# Patient Record
Sex: FEMALE | Race: WHITE | NOT HISPANIC OR LATINO | Employment: OTHER | URBAN - METROPOLITAN AREA
[De-identification: names, ages, dates, MRNs, and addresses within clinical notes are randomized per-mention and may not be internally consistent; named-entity substitution may affect disease eponyms.]

---

## 2018-08-28 ENCOUNTER — HOSPITAL ENCOUNTER (OUTPATIENT)
Dept: RADIOLOGY | Facility: MEDICAL CENTER | Age: 73
End: 2018-08-28

## 2018-08-28 ENCOUNTER — HOSPITAL ENCOUNTER (INPATIENT)
Facility: MEDICAL CENTER | Age: 73
LOS: 4 days | DRG: 694 | End: 2018-09-01
Attending: EMERGENCY MEDICINE | Admitting: HOSPITALIST
Payer: MEDICARE

## 2018-08-28 ENCOUNTER — HOSPITAL ENCOUNTER (OUTPATIENT)
Facility: MEDICAL CENTER | Age: 73
End: 2018-08-28

## 2018-08-28 DIAGNOSIS — N13.2 HYDRONEPHROSIS WITH URINARY OBSTRUCTION DUE TO RENAL CALCULUS: ICD-10-CM

## 2018-08-28 DIAGNOSIS — N20.1 URETERAL STONE: ICD-10-CM

## 2018-08-28 PROBLEM — R82.90 ABNORMAL URINALYSIS: Status: ACTIVE | Noted: 2018-08-28

## 2018-08-28 PROBLEM — E03.9 ACQUIRED HYPOTHYROIDISM: Status: ACTIVE | Noted: 2018-08-28

## 2018-08-28 PROBLEM — E78.5 DYSLIPIDEMIA: Status: ACTIVE | Noted: 2018-08-28

## 2018-08-28 PROBLEM — Z72.0 TOBACCO USE: Status: ACTIVE | Noted: 2018-08-28

## 2018-08-28 LAB
ALBUMIN SERPL BCP-MCNC: 3.8 G/DL (ref 3.2–4.9)
ALBUMIN/GLOB SERPL: 1.3 G/DL
ALP SERPL-CCNC: 88 U/L (ref 30–99)
ALT SERPL-CCNC: 13 U/L (ref 2–50)
ANION GAP SERPL CALC-SCNC: 9 MMOL/L (ref 0–11.9)
APPEARANCE UR: ABNORMAL
AST SERPL-CCNC: 18 U/L (ref 12–45)
BACTERIA #/AREA URNS HPF: ABNORMAL /HPF
BASOPHILS # BLD AUTO: 0.4 % (ref 0–1.8)
BASOPHILS # BLD: 0.11 K/UL (ref 0–0.12)
BILIRUB SERPL-MCNC: 0.6 MG/DL (ref 0.1–1.5)
BILIRUB UR QL STRIP.AUTO: NEGATIVE
BUN SERPL-MCNC: 23 MG/DL (ref 8–22)
CALCIUM SERPL-MCNC: 9.1 MG/DL (ref 8.5–10.5)
CHLORIDE SERPL-SCNC: 108 MMOL/L (ref 96–112)
CO2 SERPL-SCNC: 24 MMOL/L (ref 20–33)
COLOR UR: ABNORMAL
CREAT SERPL-MCNC: 1.03 MG/DL (ref 0.5–1.4)
EKG IMPRESSION: NORMAL
EOSINOPHIL # BLD AUTO: 0 K/UL (ref 0–0.51)
EOSINOPHIL NFR BLD: 0 % (ref 0–6.9)
EPI CELLS #/AREA URNS HPF: NEGATIVE /HPF
ERYTHROCYTE [DISTWIDTH] IN BLOOD BY AUTOMATED COUNT: 50.6 FL (ref 35.9–50)
GLOBULIN SER CALC-MCNC: 2.9 G/DL (ref 1.9–3.5)
GLUCOSE SERPL-MCNC: 142 MG/DL (ref 65–99)
GLUCOSE UR STRIP.AUTO-MCNC: NEGATIVE MG/DL
HCT VFR BLD AUTO: 41.8 % (ref 37–47)
HGB BLD-MCNC: 13.2 G/DL (ref 12–16)
HYALINE CASTS #/AREA URNS LPF: ABNORMAL /LPF
IMM GRANULOCYTES # BLD AUTO: 0.22 K/UL (ref 0–0.11)
IMM GRANULOCYTES NFR BLD AUTO: 0.8 % (ref 0–0.9)
KETONES UR STRIP.AUTO-MCNC: NEGATIVE MG/DL
LEUKOCYTE ESTERASE UR QL STRIP.AUTO: ABNORMAL
LIPASE SERPL-CCNC: 11 U/L (ref 11–82)
LYMPHOCYTES # BLD AUTO: 0.41 K/UL (ref 1–4.8)
LYMPHOCYTES NFR BLD: 1.5 % (ref 22–41)
MCH RBC QN AUTO: 29.2 PG (ref 27–33)
MCHC RBC AUTO-ENTMCNC: 31.6 G/DL (ref 33.6–35)
MCV RBC AUTO: 92.5 FL (ref 81.4–97.8)
MICRO URNS: ABNORMAL
MONOCYTES # BLD AUTO: 1.16 K/UL (ref 0–0.85)
MONOCYTES NFR BLD AUTO: 4.1 % (ref 0–13.4)
NEUTROPHILS # BLD AUTO: 26.3 K/UL (ref 2–7.15)
NEUTROPHILS NFR BLD: 93.2 % (ref 44–72)
NITRITE UR QL STRIP.AUTO: POSITIVE
NRBC # BLD AUTO: 0 K/UL
NRBC BLD-RTO: 0 /100 WBC
PH UR STRIP.AUTO: 5.5 [PH]
PLATELET # BLD AUTO: 173 K/UL (ref 164–446)
PMV BLD AUTO: 11.6 FL (ref 9–12.9)
POTASSIUM SERPL-SCNC: 3.8 MMOL/L (ref 3.6–5.5)
PROT SERPL-MCNC: 6.7 G/DL (ref 6–8.2)
PROT UR QL STRIP: 100 MG/DL
RBC # BLD AUTO: 4.52 M/UL (ref 4.2–5.4)
RBC # URNS HPF: ABNORMAL /HPF
RBC UR QL AUTO: ABNORMAL
SODIUM SERPL-SCNC: 141 MMOL/L (ref 135–145)
SP GR UR STRIP.AUTO: 1.01
UROBILINOGEN UR STRIP.AUTO-MCNC: 1 MG/DL
WBC # BLD AUTO: 28.2 K/UL (ref 4.8–10.8)
WBC #/AREA URNS HPF: ABNORMAL /HPF

## 2018-08-28 PROCEDURE — 87186 SC STD MICRODIL/AGAR DIL: CPT

## 2018-08-28 PROCEDURE — 99223 1ST HOSP IP/OBS HIGH 75: CPT | Performed by: HOSPITALIST

## 2018-08-28 PROCEDURE — 700111 HCHG RX REV CODE 636 W/ 250 OVERRIDE (IP): Performed by: HOSPITALIST

## 2018-08-28 PROCEDURE — 81001 URINALYSIS AUTO W/SCOPE: CPT

## 2018-08-28 PROCEDURE — 83690 ASSAY OF LIPASE: CPT

## 2018-08-28 PROCEDURE — 87086 URINE CULTURE/COLONY COUNT: CPT

## 2018-08-28 PROCEDURE — 700105 HCHG RX REV CODE 258: Performed by: EMERGENCY MEDICINE

## 2018-08-28 PROCEDURE — 80053 COMPREHEN METABOLIC PANEL: CPT

## 2018-08-28 PROCEDURE — 87077 CULTURE AEROBIC IDENTIFY: CPT

## 2018-08-28 PROCEDURE — 700102 HCHG RX REV CODE 250 W/ 637 OVERRIDE(OP): Performed by: HOSPITALIST

## 2018-08-28 PROCEDURE — 700105 HCHG RX REV CODE 258: Performed by: HOSPITALIST

## 2018-08-28 PROCEDURE — 85025 COMPLETE CBC W/AUTO DIFF WBC: CPT

## 2018-08-28 PROCEDURE — 93005 ELECTROCARDIOGRAM TRACING: CPT | Performed by: EMERGENCY MEDICINE

## 2018-08-28 PROCEDURE — 770006 HCHG ROOM/CARE - MED/SURG/GYN SEMI*

## 2018-08-28 PROCEDURE — A9270 NON-COVERED ITEM OR SERVICE: HCPCS | Performed by: HOSPITALIST

## 2018-08-28 PROCEDURE — 96365 THER/PROPH/DIAG IV INF INIT: CPT

## 2018-08-28 PROCEDURE — 99285 EMERGENCY DEPT VISIT HI MDM: CPT

## 2018-08-28 PROCEDURE — 700111 HCHG RX REV CODE 636 W/ 250 OVERRIDE (IP): Performed by: EMERGENCY MEDICINE

## 2018-08-28 PROCEDURE — 96375 TX/PRO/DX INJ NEW DRUG ADDON: CPT

## 2018-08-28 RX ORDER — SODIUM CHLORIDE 9 MG/ML
INJECTION, SOLUTION INTRAVENOUS CONTINUOUS
Status: DISCONTINUED | OUTPATIENT
Start: 2018-08-28 | End: 2018-08-28

## 2018-08-28 RX ORDER — LEVOTHYROXINE SODIUM 0.07 MG/1
75 TABLET ORAL
Status: DISCONTINUED | OUTPATIENT
Start: 2018-08-28 | End: 2018-09-01 | Stop reason: HOSPADM

## 2018-08-28 RX ORDER — KETOROLAC TROMETHAMINE 30 MG/ML
15 INJECTION, SOLUTION INTRAMUSCULAR; INTRAVENOUS EVERY 6 HOURS PRN
Status: DISCONTINUED | OUTPATIENT
Start: 2018-08-28 | End: 2018-09-01 | Stop reason: HOSPADM

## 2018-08-28 RX ORDER — ONDANSETRON 2 MG/ML
4 INJECTION INTRAMUSCULAR; INTRAVENOUS ONCE
Status: COMPLETED | OUTPATIENT
Start: 2018-08-28 | End: 2018-08-28

## 2018-08-28 RX ORDER — PRAVASTATIN SODIUM 20 MG
80 TABLET ORAL NIGHTLY
Status: DISCONTINUED | OUTPATIENT
Start: 2018-08-28 | End: 2018-09-01 | Stop reason: HOSPADM

## 2018-08-28 RX ORDER — SODIUM CHLORIDE 9 MG/ML
INJECTION, SOLUTION INTRAVENOUS CONTINUOUS
Status: DISCONTINUED | OUTPATIENT
Start: 2018-08-28 | End: 2018-08-31

## 2018-08-28 RX ORDER — BACLOFEN 10 MG/1
10-20 TABLET ORAL 3 TIMES DAILY PRN
Status: DISCONTINUED | OUTPATIENT
Start: 2018-08-28 | End: 2018-09-01 | Stop reason: HOSPADM

## 2018-08-28 RX ORDER — IBUPROFEN 200 MG
200 TABLET ORAL EVERY 6 HOURS PRN
COMMUNITY
End: 2018-09-21

## 2018-08-28 RX ORDER — PRAVASTATIN SODIUM 80 MG/1
80 TABLET ORAL NIGHTLY
COMMUNITY

## 2018-08-28 RX ORDER — GABAPENTIN 400 MG/1
800 CAPSULE ORAL 3 TIMES DAILY
Status: DISCONTINUED | OUTPATIENT
Start: 2018-08-28 | End: 2018-09-01 | Stop reason: HOSPADM

## 2018-08-28 RX ORDER — AMOXICILLIN 250 MG
2 CAPSULE ORAL 2 TIMES DAILY
Status: DISCONTINUED | OUTPATIENT
Start: 2018-08-28 | End: 2018-09-01 | Stop reason: HOSPADM

## 2018-08-28 RX ORDER — BACLOFEN 10 MG/1
10-20 TABLET ORAL 2 TIMES DAILY
COMMUNITY

## 2018-08-28 RX ORDER — ONDANSETRON 2 MG/ML
4 INJECTION INTRAMUSCULAR; INTRAVENOUS EVERY 4 HOURS PRN
Status: DISCONTINUED | OUTPATIENT
Start: 2018-08-28 | End: 2018-09-01 | Stop reason: HOSPADM

## 2018-08-28 RX ORDER — GABAPENTIN 800 MG/1
800 TABLET ORAL 3 TIMES DAILY
COMMUNITY

## 2018-08-28 RX ORDER — LABETALOL HYDROCHLORIDE 5 MG/ML
10 INJECTION, SOLUTION INTRAVENOUS EVERY 4 HOURS PRN
Status: DISCONTINUED | OUTPATIENT
Start: 2018-08-28 | End: 2018-09-01 | Stop reason: HOSPADM

## 2018-08-28 RX ORDER — POLYETHYLENE GLYCOL 3350 17 G/17G
1 POWDER, FOR SOLUTION ORAL
Status: DISCONTINUED | OUTPATIENT
Start: 2018-08-28 | End: 2018-09-01 | Stop reason: HOSPADM

## 2018-08-28 RX ORDER — ONDANSETRON 4 MG/1
4 TABLET, ORALLY DISINTEGRATING ORAL EVERY 4 HOURS PRN
Status: DISCONTINUED | OUTPATIENT
Start: 2018-08-28 | End: 2018-09-01 | Stop reason: HOSPADM

## 2018-08-28 RX ORDER — MORPHINE SULFATE 4 MG/ML
4 INJECTION, SOLUTION INTRAMUSCULAR; INTRAVENOUS
Status: DISCONTINUED | OUTPATIENT
Start: 2018-08-28 | End: 2018-08-28

## 2018-08-28 RX ORDER — BISACODYL 10 MG
10 SUPPOSITORY, RECTAL RECTAL
Status: DISCONTINUED | OUTPATIENT
Start: 2018-08-28 | End: 2018-09-01 | Stop reason: HOSPADM

## 2018-08-28 RX ORDER — ACETAMINOPHEN 325 MG/1
650 TABLET ORAL EVERY 6 HOURS PRN
Status: DISCONTINUED | OUTPATIENT
Start: 2018-08-28 | End: 2018-09-01 | Stop reason: HOSPADM

## 2018-08-28 RX ADMIN — KETOROLAC TROMETHAMINE 15 MG: 30 INJECTION, SOLUTION INTRAMUSCULAR at 20:23

## 2018-08-28 RX ADMIN — CEFTRIAXONE SODIUM 1 G: 1 INJECTION, POWDER, FOR SOLUTION INTRAMUSCULAR; INTRAVENOUS at 11:41

## 2018-08-28 RX ADMIN — SODIUM CHLORIDE: 9 INJECTION, SOLUTION INTRAVENOUS at 11:41

## 2018-08-28 RX ADMIN — ONDANSETRON HYDROCHLORIDE 4 MG: 2 INJECTION, SOLUTION INTRAMUSCULAR; INTRAVENOUS at 11:40

## 2018-08-28 RX ADMIN — GABAPENTIN 800 MG: 400 CAPSULE ORAL at 17:42

## 2018-08-28 RX ADMIN — SODIUM CHLORIDE: 9 INJECTION, SOLUTION INTRAVENOUS at 15:18

## 2018-08-28 RX ADMIN — BACLOFEN 10 MG: 10 TABLET ORAL at 20:26

## 2018-08-28 RX ADMIN — Medication 2 TABLET: at 17:42

## 2018-08-28 RX ADMIN — LEVOTHYROXINE SODIUM 75 MCG: 75 TABLET ORAL at 15:17

## 2018-08-28 RX ADMIN — PRAVASTATIN SODIUM 80 MG: 20 TABLET ORAL at 20:23

## 2018-08-28 ASSESSMENT — COGNITIVE AND FUNCTIONAL STATUS - GENERAL
MOBILITY SCORE: 24
SUGGESTED CMS G CODE MODIFIER MOBILITY: CH
DAILY ACTIVITIY SCORE: 24
SUGGESTED CMS G CODE MODIFIER DAILY ACTIVITY: CH

## 2018-08-28 ASSESSMENT — ENCOUNTER SYMPTOMS
NERVOUS/ANXIOUS: 0
NAUSEA: 1
STRIDOR: 0
HEADACHES: 0
COUGH: 0
VOMITING: 1
BACK PAIN: 1
ABDOMINAL PAIN: 1
FEVER: 0
CHILLS: 0
NECK PAIN: 0
PALPITATIONS: 0
SHORTNESS OF BREATH: 0

## 2018-08-28 ASSESSMENT — PATIENT HEALTH QUESTIONNAIRE - PHQ9
SUM OF ALL RESPONSES TO PHQ9 QUESTIONS 1 AND 2: 0
2. FEELING DOWN, DEPRESSED, IRRITABLE, OR HOPELESS: NOT AT ALL
1. LITTLE INTEREST OR PLEASURE IN DOING THINGS: NOT AT ALL

## 2018-08-28 ASSESSMENT — COPD QUESTIONNAIRES
DO YOU EVER COUGH UP ANY MUCUS OR PHLEGM?: NO/ONLY WITH OCCASIONAL COLDS OR INFECTIONS
COPD SCREENING SCORE: 5
HAVE YOU SMOKED AT LEAST 100 CIGARETTES IN YOUR ENTIRE LIFE: YES
DURING THE PAST 4 WEEKS HOW MUCH DID YOU FEEL SHORT OF BREATH: SOME OF THE TIME
IN THE PAST 12 MONTHS DO YOU DO LESS THAN YOU USED TO BECAUSE OF YOUR BREATHING PROBLEMS: DISAGREE/UNSURE

## 2018-08-28 ASSESSMENT — PAIN SCALES - GENERAL
PAINLEVEL_OUTOF10: 0
PAINLEVEL_OUTOF10: 5

## 2018-08-28 ASSESSMENT — LIFESTYLE VARIABLES: ALCOHOL_USE: NO

## 2018-08-28 NOTE — ED NOTES
Med rec updated and complete  Allergies reviewed  Pt was not sure the strengths of all her medications.  Called [facility] @ [phone] to verify all prescription medications.  Pt reports no antibiotics in the last 30 days.

## 2018-08-28 NOTE — ASSESSMENT & PLAN NOTE
Patient states some burning with urination   UA showed blood, leukocyte Estrace positive, positive bacteria but no significant leukocytosis  IV Rocephin initiated   Nephrostomy tube in place

## 2018-08-28 NOTE — ASSESSMENT & PLAN NOTE
Dr. Antonio Pillai, urologist, consulted by emergency room [facility]  -IV fluids normal saline 125 ML's per hour  -Pain control with Toradol when necessary and morphine as needed  -Baclofen every 8 hours as needed  PRN antiemetics   Nephrostomy tube placed

## 2018-08-28 NOTE — H&P
Hospital Medicine History & Physical Note    Date of Service  2018    Primary Care Physician  Madhav Rasmussen M.D.    Consultants  Urology    Code Status  DNR/DNI    Chief Complaint  Left flank and abdominal pain.    History of Presenting Illness  73 y.o. female who presented 2018 with acute onset of left flank left lower quadrant abdominal severe pain at 2 AM that woke her from sleep. Patient went straight to the hospital up in [facility] upon she is found to have an obstructing 2.2 x 1.6 cm obstructing stone in the left renal pelvis causing moderate left hydronephrosis. Patient states some dark urine no noted gross hematuria. She denied any fevers or chills. She had a prior kidney stone back in the .    Review of Systems  Review of Systems   Constitutional: Negative for chills and fever.   Respiratory: Negative for cough, shortness of breath and stridor.    Cardiovascular: Negative for chest pain and palpitations.   Gastrointestinal: Positive for abdominal pain, nausea and vomiting.   Genitourinary: Negative for dysuria and hematuria.   Musculoskeletal: Positive for back pain (left flank). Negative for neck pain.   Neurological: Negative for headaches.   Psychiatric/Behavioral: The patient is not nervous/anxious.        Past Medical History  Hypothyroid  Hyperlipidemia  Chronic back pain  Tobacco use    Surgical History   has a past surgical history that includes other (childhood); other abdominal surgery (?); gyn surgery (?); other orthopedic surgery; other orthopedic surgery; other neurological surg (); other neurological surg (); other neurological surg (); and lumbar laminectomy diskectomy (10/16/2010).     Family History  Mother  in her 30s secondary to motor vehicle accident  Father  in his 60s secondary to brain aneurysm that occurred as a complication from prostate cancer surgery    Social History  She has lived her life this being single  She is retired  from administration  She has no children  She has not used any illicit drugs  To smoke 6 cigarettes a day and has no plan to stop despite our discussion on cessation and the harms of smoking  She is not having any significant alcohol history    Allergies  Allergies   Allergen Reactions   • E-Mycin [Erythromycin] Anaphylaxis       Medications  Prior to Admission Medications   Prescriptions Last Dose Informant Patient Reported? Taking?   B Complex Vitamins (B COMPLEX 1 PO) 8/27/2018 at 0600 Patient Yes No   Sig: Take 1 Tab by mouth every day.   Calcium Carbonate (CALCIUM 600) 600 MG TABS 8/27/2018 at 0600 Patient Yes No   Sig: Take 1 Tab by mouth 2 Times a Day.   baclofen (LIORESAL) 10 MG Tab 8/27/2018 at 1900 Patient's Home Pharmacy Yes Yes   Sig: Take 10-20 mg by mouth 2 Times a Day. Pt takes:  10MG in AM  20MG HS   gabapentin (NEURONTIN) 800 MG tablet 8/27/2018 at 1900 Patient's Home Pharmacy Yes Yes   Sig: Take 800 mg by mouth 3 times a day.   ibuprofen (MOTRIN) 200 MG Tab > 10 days at Unknown Patient Yes Yes   Sig: Take 200 mg by mouth every 6 hours as needed for Mild Pain.   levothyroxine (SYNTHROID) 75 MCG TABS 8/27/2018 at 0600 Patient's Home Pharmacy Yes No   Sig: Take 75 mcg by mouth every day.   pravastatin (PRAVACHOL) 80 MG tablet 8/27/2018 at 1900 Patient's Home Pharmacy Yes Yes   Sig: Take 80 mg by mouth every evening.   senna-docusate (JESS-COLACE) 8.6-50 MG TABS > 1 week at Unknown Patient Yes No   Sig: Take 2 Tabs by mouth as needed for Constipation.      Facility-Administered Medications: None       Physical Exam  Blood Pressure : 103/56   Temperature: 36.9 °C (98.4 °F)   Pulse: 69   Respiration: 18   Pulse Oximetry: 100 %     Physical Exam   Constitutional: She is oriented to person, place, and time. She appears well-developed and well-nourished. No distress.   HENT:   Head: Normocephalic and atraumatic.   Nose: Nose normal.   Mouth/Throat: Oropharynx is clear and moist.   Eyes: Conjunctivae and EOM  are normal. Right eye exhibits no discharge. Left eye exhibits no discharge. No scleral icterus.   Neck: No tracheal deviation present.   Posterior cervical area with well-healed cervical surgical scar   Cardiovascular: Normal rate, regular rhythm, normal heart sounds and intact distal pulses.    No murmur heard.  Pulmonary/Chest: Effort normal and breath sounds normal. No respiratory distress. She has no wheezes. She has no rales.   Slightly diminished breath sounds throughout   Abdominal: Soft. Bowel sounds are normal. She exhibits no distension. There is tenderness (mild tenderness left lower abdomen and left flank).   Musculoskeletal: She exhibits no edema.   Lymphadenopathy:     She has no cervical adenopathy.   Neurological: She is alert and oriented to person, place, and time. No cranial nerve deficit.   Skin: Skin is warm. She is not diaphoretic.   Psychiatric: She has a normal mood and affect. Her behavior is normal. Thought content normal.   Vitals reviewed.      Laboratory:  [facility] labs from 8/28/2018:  Urinalysis shows 2+ blood and positive nitrites 3+ leukocyte Estrace 0-3 WBCs 1+ bacteria  CBC shows WBC 12.8, hemoglobin 13.4, hematocrit 39.3, platelet 155  Glucose 1:15  BUN 21  Creatinine 0.9  Sodium 142  Potassium 3.8  Chloride 107  CO2 24  Calcium 9.5  Albumin 3.7   liver enzymes unremarkable      Imaging:  As above in history of present illness      Assessment/Plan:  I anticipate this patient will require at least two midnights for appropriate medical management, necessitating inpatient admission.    Hydronephrosis with urinary obstruction due to renal calculus- (present on admission)   Assessment & Plan    Dr. Antonio Pillai, urologist, consulted by emergency room M.D.  Patient will be on IV fluids normal saline 125 ML's per hour  Pain control with Toradol when necessary and morphine as needed  Will continue with her baclofen every 8 hours as needed  Antinausea medications as  needed  Possible nephrostomy tube versus surgery        Abnormal urinalysis   Assessment & Plan    Patient denies any dysuria or recent frequency  Outside UA showed blood, leukocyte Estrace positive, positive bacteria but no significant leukocytosis  Await repeat urinalysis here at [facility]        Acquired hypothyroidism   Assessment & Plan    Continue with active treatment with levothyroxine        Dyslipidemia   Assessment & Plan    Continue with pravastatin for ongoing active treatment        Tobacco use   Assessment & Plan    Smoking cessation discussed the patient has no interest in quitting. She did not wish to continue any further discussion on the matter.  States she smokes 6 cigarettes a day.  Nicotine patch if so required            VTE prophylaxis: Lovenox

## 2018-08-28 NOTE — ASSESSMENT & PLAN NOTE
Smoking cessation discussed the patient has no interest in quitting. She did not wish to continue any further discussion on the matter.  States she smokes 6 cigarettes a day.  Nicotine patch if so required

## 2018-08-28 NOTE — ED PROVIDER NOTES
ED Provider Note    CHIEF COMPLAINT  No chief complaint on file.      HPI  Shanelle Unger is a 73 y.o. female with a history of hypercholesterolemia, borderline hypertension, kidney stone ×1 who presents by EMS on transfer from [facility] with sudden onset of left lower quadrant and left flank pain beginning at about 2 AM this morning.  The patient says she was awakened from sleep with severe pain to her left lower abdomen.  She went to the outside hospital where a CT scan showed a 2.2 x 1.6 cm stone in the left renal pelvis with moderate left hydronephrosis.  The patient received Toradol, Dilaudid, Pyridium, prior to arrival.  At this time she complains of some mild pain, says the pain is much better.  She says she is very thirsty.  She did have nausea and vomiting after the onset of her pain.  She has had a previous total abdominal hysterectomy and appendectomy in the past.    REVIEW OF SYSTEMS  See HPI for further details. All other systems are negative.     PAST MEDICAL HISTORY  Past Medical History:   Diagnosis Date   • Anesthesia     post-op nausea slow to wake up   • Unspecified disorder of thyroid        FAMILY HISTORY  No family history on file.    SOCIAL HISTORY  Social History     Social History   • Marital status: Single     Spouse name: N/A   • Number of children: N/A   • Years of education: N/A     Social History Main Topics   • Smoking status: Current Every Day Smoker   • Smokeless tobacco: Not on file      Comment: 1/2ppd   50 years   • Alcohol use Yes      Comment: rare   • Drug use: No   • Sexual activity: Not on file     Other Topics Concern   • Not on file     Social History Narrative   • No narrative on file       SURGICAL HISTORY  Past Surgical History:   Procedure Laterality Date   • LUMBAR LAMINECTOMY DISKECTOMY  10/16/2010    Performed by AUNDREA FERRER at SURGERY [facility]   • OTHER NEUROLOGICAL SURG  2007    cervical   • OTHER NEUROLOGICAL SURG  1998    cervical    •  OTHER NEUROLOGICAL SURG  1972    C Spine   • GYN SURGERY  1983?    hysterectomy   • OTHER  childhood    tonsillectomy   • OTHER ABDOMINAL SURGERY  1962?    appendectomy   • OTHER ORTHOPEDIC SURGERY      RT Knee Scope   • OTHER ORTHOPEDIC SURGERY      RT Thumb tendon release       CURRENT MEDICATIONS  Home Medications     Reviewed by Chen Kaba (Pharmacy Tech) on 08/28/18 at 1114  Med List Status: Complete   Medication Last Dose Status   B Complex Vitamins (B COMPLEX 1 PO) 8/27/2018 Active   baclofen (LIORESAL) 10 MG Tab 8/27/2018 Active   Calcium Carbonate (CALCIUM 600) 600 MG TABS 8/27/2018 Active   gabapentin (NEURONTIN) 800 MG tablet 8/27/2018 Active   ibuprofen (MOTRIN) 200 MG Tab > 10 days Active   levothyroxine (SYNTHROID) 75 MCG TABS 8/27/2018 Active   pravastatin (PRAVACHOL) 80 MG tablet 8/27/2018 Active   senna-docusate (JESS-COLACE) 8.6-50 MG TABS > 1 week Active                ALLERGIES  Allergies   Allergen Reactions   • E-Mycin [Erythromycin] Anaphylaxis       PHYSICAL EXAM  VITAL SIGNS: /56   Pulse 69   Temp 36.9 °C (98.4 °F)   Resp 18   Wt 54.4 kg (120 lb)   SpO2 100%   BMI 21.95 kg/m²   Constitutional: Awake, alert, in no acute distress, Non-toxic appearance.   HENT: Atraumatic. Bilateral external ears normal, mucous membranes very dry, throat nonerythematous without exudates, nose is normal.  Eyes: PERRL, EOMI, conjunctiva moist, noninjected.  Neck: Nontender, Normal range of motion, No nuchal rigidity, No stridor.   Lymphatic: No lymphadenopathy noted.   Cardiovascular: Regular rate and rhythm, no murmurs, rubs, gallops.  Thorax & Lungs:  Good breath sounds bilaterally, no wheezes, rales, or retractions.  No chest tenderness.  Abdomen: Bowel sounds normal, Soft, nondistended, there is tenderness to the left lower quadrant, no tenderness to the upper abdomen in the right lower quadrant, no rebound, guarding, masses.  Back: No spinal tenderness, moderate left CVA  tenderness.  Extremities: Intact distal pulses, No edema, No tenderness.   Skin: Warm, Dry, No rashes.   Musculoskeletal: No joint swelling or tenderness.  Neurologic: Alert & oriented x 3, sensory and motor function normal. No focal deficits.   Psychiatric: Affect normal, Judgment normal, Mood normal.     EKG  Twelve-lead EKG shows normal sinus rhythm, rate of 68, normal intervals, normal axis, no acute ST wave elevations or ST depressions, no pathologic Q waves, no evidence of an acute injury or ischemic pattern on my reading, there is no previous EKG for comparison.        RADIOLOGY/PROCEDURES  CT scan of the abdomen/pelvis without contrast from outside hospital was read as showing a large 2.2 x 1.6 cm obstructing stone in the left renal pelvis causing moderate left hydronephrosis.    COURSE & MEDICAL DECISION MAKING  Pertinent Labs & Imaging studies reviewed. (See chart for details)  The patient presents on transfer with a large left obstructing renal stone.  Her pain is in fairly good control at this time.  She was continued on IV fluids as she is n.p.o. and has very dry mucous membranes.  Results from outside hospital were reviewed shows a white count of 12,800, BUN of 21, creatinine 0.9.  Her urine from the outside hospital did show positive nitrites, 3+ leukocyte esterase.  She did become mildly hypotensive with a blood pressure of 96/52.  She was fluid resuscitated with normal saline,  which improved to 126/69.  Patient was given Rocephin 1 g IVPB.  Dr. De Los Santos of urology was consulted and will see the patient.  Case was discussed with Dr. Tripathi for admission.  Urinalysis obtained here did show large blood, 100 protein, positive nitrite, large leukocyte esterase, packed WBC and many bacteria.  Critical care time of 35 minutes.    FINAL IMPRESSION  1.  Obstructing 2.2 x 1.6 cm stone left renal pelvis  2.  Urinary tract infection  3.         Electronically signed by: Merrill العراقي, 8/28/2018 11:24 AM

## 2018-08-28 NOTE — CONSULTS
UROLOGY Consult Note:    Latia Betancourt  Date & Time note created:    8/28/2018   3:59 PM     Referring MD:  Dr. Tripathi    Patient ID:   Name:             Shanelle Unger     YOB: 1945  Age:                 73 y.o.  female   MRN:               [id number]                                                             Reason for Consult:      Left obstructing calculi in left renal pelvis    History of Present Illness:    73 yof who presented to the ED in [place] with acute onset of  left flank pain as well as left sided abdominal pain 10/10. Pt states pain woke her from her sleep and would not improve with positioning.  CT done in [place] showed a 2.2 x 1.6 cm obstructing stone in the left renal pelvis causing moderate left hydronephrosis. She denied experiencing any fever, chills, or hematuria. Pt states she was told she had a kidney stone sometime in the 1980's.   Currently  She  states her  pain is 2-3/10 following pain medication    Review of Systems:      Constitutional: Negative for chills and fever.   Respiratory: Negative for cough, shortness of breath and stridor.    Cardiovascular: Negative for chest pain and palpitations.   Gastrointestinal: Positive for abdominal pain, nausea and vomiting.   Genitourinary: Negative for dysuria and hematuria, + dark cory urine.   Musculoskeletal: Positive for back pain (left flank). Negative for neck pain.   Abdominal: + left lower quadrant pain   Neurological: Negative for headaches.   Psychiatric/Behavioral: The patient is not nervous/anxious    Past Medical History:   Past Medical History:   Diagnosis Date   • Anesthesia     post-op nausea slow to wake up   • Unspecified disorder of thyroid      Active Hospital Problems    Diagnosis   • Hydronephrosis with urinary obstruction due to renal calculus [N13.2]     Priority: High   • Abnormal urinalysis [R82.90]     Priority: Medium   • Tobacco use [Z72.0]     Priority: Low   • Dyslipidemia [E78.5]      Priority: Low   • Acquired hypothyroidism [E03.9]     Priority: Low       Past Surgical History:  Past Surgical History:   Procedure Laterality Date   • LUMBAR LAMINECTOMY DISKECTOMY  10/16/2010    Performed by AUNDREA FERRER at [facility]   • OTHER NEUROLOGICAL SURG  2007    cervical   • OTHER NEUROLOGICAL SURG  1998    cervical    • OTHER NEUROLOGICAL SURG  1972    C Spine   • GYN SURGERY  1983?    hysterectomy   • OTHER  childhood    tonsillectomy   • OTHER ABDOMINAL SURGERY  1962?    appendectomy   • OTHER ORTHOPEDIC SURGERY      RT Knee Scope   • OTHER ORTHOPEDIC SURGERY      RT Thumb tendon release       Hospital Medications:    Current Facility-Administered Medications:   •  baclofen (LIORESAL) tablet 10-20 mg, 10-20 mg, Oral, TID PRN, Abdiel Tripathi D.O.  •  gabapentin (NEURONTIN) capsule 800 mg, 800 mg, Oral, TID, Abdiel Tripathi D.O.  •  levothyroxine (SYNTHROID) tablet 75 mcg, 75 mcg, Oral, QAM AC, Abdiel Tripathi D.O., 75 mcg at 08/28/18 1517  •  pravastatin (PRAVACHOL) tablet 80 mg, 80 mg, Oral, Nightly, Abdiel Tripathi D.O.  •  senna-docusate (PERICOLACE or SENOKOT S) 8.6-50 MG per tablet 2 Tab, 2 Tab, Oral, BID **AND** polyethylene glycol/lytes (MIRALAX) PACKET 1 Packet, 1 Packet, Oral, QDAY PRN **AND** magnesium hydroxide (MILK OF MAGNESIA) suspension 30 mL, 30 mL, Oral, QDAY PRN **AND** bisacodyl (DULCOLAX) suppository 10 mg, 10 mg, Rectal, QDAY PRN, Abdiel Tripathi D.O.  •  NS infusion, , Intravenous, Continuous, Abdiel Tripathi D.O., Last Rate: 125 mL/hr at 08/28/18 1518  •  [START ON 8/29/2018] enoxaparin (LOVENOX) inj 40 mg, 40 mg, Subcutaneous, DAILY, Abdiel Tripathi D.O.  •  labetalol (NORMODYNE,TRANDATE) injection 10 mg, 10 mg, Intravenous, Q4HRS PRN, Abdiel Tripathi D.O.  •  ondansetron (ZOFRAN) syringe/vial injection 4 mg, 4 mg, Intravenous, Q4HRS PRN, NOHEMI MarchO.  •  ondansetron (ZOFRAN ODT) dispertab 4 mg, 4 mg, Oral, Q4HRS PRN, NOHEMI MarchO.  •  acetaminophen  (TYLENOL) tablet 650 mg, 650 mg, Oral, Q6HRS PRN, NOHEMI MarchO.  •  ketorolac (TORADOL) injection 15 mg, 15 mg, Intravenous, Q6HRS PRN, Abdiel Tripathi D.O.    Current Outpatient Medications:  Prescriptions Prior to Admission   Medication Sig Dispense Refill Last Dose   • gabapentin (NEURONTIN) 800 MG tablet Take 800 mg by mouth 3 times a day.   8/27/2018 at 1900   • baclofen (LIORESAL) 10 MG Tab Take 10-20 mg by mouth 2 Times a Day. Pt takes:  10MG in AM  20MG HS   8/27/2018 at 1900   • ibuprofen (MOTRIN) 200 MG Tab Take 200 mg by mouth every 6 hours as needed for Mild Pain.   > 10 days at Unknown   • pravastatin (PRAVACHOL) 80 MG tablet Take 80 mg by mouth every evening.   8/27/2018 at 1900   • senna-docusate (JESS-COLACE) 8.6-50 MG TABS Take 2 Tabs by mouth as needed for Constipation.   > 1 week at Unknown   • levothyroxine (SYNTHROID) 75 MCG TABS Take 75 mcg by mouth every day.   8/27/2018 at 0600   • Calcium Carbonate (CALCIUM 600) 600 MG TABS Take 1 Tab by mouth 2 Times a Day.   8/27/2018 at 0600   • B Complex Vitamins (B COMPLEX 1 PO) Take 1 Tab by mouth every day.   8/27/2018 at 0600       Medication Allergy:  Allergies   Allergen Reactions   • E-Mycin [Erythromycin] Anaphylaxis       Family History:  Family History   Problem Relation Age of Onset   • Other Mother 32        MVA   • Other Father 66        brain aneurysm following prostate CA surgery       Social History:  Social History     Social History   • Marital status: Single     Spouse name: N/A   • Number of children: 0   • Years of education: N/A     Occupational History   • Not on file.     Social History Main Topics   • Smoking status: Current Every Day Smoker   • Smokeless tobacco: Not on file      Comment: 1/2ppd   50 years   • Alcohol use Yes      Comment: rare   • Drug use: No   • Sexual activity: Not on file     Other Topics Concern   • Not on file     Social History Narrative   • No narrative on file         Physical Exam:  Vitals/  "General Appearance:   Weight/BMI: Body mass index is 21.95 kg/m².  Blood pressure 126/69, pulse 79, temperature 36.7 °C (98 °F), resp. rate 16, height 1.575 m (5' 2\"), weight 54.4 kg (120 lb), SpO2 93 %.  Vitals:    08/28/18 1300 08/28/18 1330 08/28/18 1400 08/28/18 1435   BP:    126/69   Pulse: 69 67 72 79   Resp: 14 19 16 16   Temp:    36.7 °C (98 °F)   SpO2: 98% 98% 97% 93%   Weight:       Height:         Oxygen Therapy:  Pulse Oximetry: 93 %, O2 (LPM): 0, O2 Delivery: None (Room Air)    Constitutional:   Well developed, Well nourished, No acute distress  HENMT:  Normocephalic, Atraumatic, Oropharynx moist mucous membranes, No oral exudates, Nose normal.  No thyromegaly.  Eyes:  EOMI, Conjunctiva normal, No discharge.  Neck:  Normal range of motion, No cervical tenderness,  no JVD.  Cardiovascular:  Normal heart rate, Normal rhythm, No murmurs, No rubs, No gallops.   Extremitites with intact distal pulses, no cyanosis, or edema.  Lungs:  Normal breath sounds, breath sounds clear to auscultation bilaterally,  no crackles, no wheezing.   Abdomen: Bowel sounds normal, Soft, + LL quadrant tenderness   : L flank tenderness with palp- urine cory   Skin: Warm, Dry, No erythema, No rash, no induration.  Neurologic: Alert & oriented x 3, No focal deficits noted, cranial nerves II through X are grossly intact.  Psychiatric: Affect normal, Judgment normal, Mood normal.      MDM (Data Review):     Records reviewed and summarized in current documentation    Lab Data Review:  Recent Results (from the past 24 hour(s))   URINALYSIS    Collection Time: 08/28/18 11:34 AM   Result Value Ref Range    Color DK Yellow     Character Turbid (A)     Specific Gravity 1.015 <1.035    Ph 5.5 5.0 - 8.0    Glucose Negative Negative mg/dL    Ketones Negative Negative mg/dL    Protein 100 (A) Negative mg/dL    Bilirubin Negative Negative    Urobilinogen, Urine 1.0 Negative    Nitrite Positive (A) Negative    Leukocyte Esterase Large (A) " Negative    Occult Blood Large (A) Negative    Micro Urine Req Microscopic    URINE MICROSCOPIC (W/UA)    Collection Time: 08/28/18 11:34 AM   Result Value Ref Range    WBC Packed (A) /hpf    -150 (A) /hpf    Bacteria Many (A) None /hpf    Epithelial Cells Negative /hpf    Hyaline Cast 3-5 (A) /lpf   CBC WITH DIFFERENTIAL    Collection Time: 08/28/18 11:43 AM   Result Value Ref Range    WBC 28.2 (H) 4.8 - 10.8 K/uL    RBC 4.52 4.20 - 5.40 M/uL    Hemoglobin 13.2 12.0 - 16.0 g/dL    Hematocrit 41.8 37.0 - 47.0 %    MCV 92.5 81.4 - 97.8 fL    MCH 29.2 27.0 - 33.0 pg    MCHC 31.6 (L) 33.6 - 35.0 g/dL    RDW 50.6 (H) 35.9 - 50.0 fL    Platelet Count 173 164 - 446 K/uL    MPV 11.6 9.0 - 12.9 fL    Neutrophils-Polys 93.20 (H) 44.00 - 72.00 %    Lymphocytes 1.50 (L) 22.00 - 41.00 %    Monocytes 4.10 0.00 - 13.40 %    Eosinophils 0.00 0.00 - 6.90 %    Basophils 0.40 0.00 - 1.80 %    Immature Granulocytes 0.80 0.00 - 0.90 %    Nucleated RBC 0.00 /100 WBC    Neutrophils (Absolute) 26.30 (H) 2.00 - 7.15 K/uL    Lymphs (Absolute) 0.41 (L) 1.00 - 4.80 K/uL    Monos (Absolute) 1.16 (H) 0.00 - 0.85 K/uL    Eos (Absolute) 0.00 0.00 - 0.51 K/uL    Baso (Absolute) 0.11 0.00 - 0.12 K/uL    Immature Granulocytes (abs) 0.22 (H) 0.00 - 0.11 K/uL    NRBC (Absolute) 0.00 K/uL   COMP METABOLIC PANEL    Collection Time: 08/28/18 11:43 AM   Result Value Ref Range    Sodium 141 135 - 145 mmol/L    Potassium 3.8 3.6 - 5.5 mmol/L    Chloride 108 96 - 112 mmol/L    Co2 24 20 - 33 mmol/L    Anion Gap 9.0 0.0 - 11.9    Glucose 142 (H) 65 - 99 mg/dL    Bun 23 (H) 8 - 22 mg/dL    Creatinine 1.03 0.50 - 1.40 mg/dL    Calcium 9.1 8.5 - 10.5 mg/dL    AST(SGOT) 18 12 - 45 U/L    ALT(SGPT) 13 2 - 50 U/L    Alkaline Phosphatase 88 30 - 99 U/L    Total Bilirubin 0.6 0.1 - 1.5 mg/dL    Albumin 3.8 3.2 - 4.9 g/dL    Total Protein 6.7 6.0 - 8.2 g/dL    Globulin 2.9 1.9 - 3.5 g/dL    A-G Ratio 1.3 g/dL   LIPASE    Collection Time: 08/28/18 11:43 AM    Result Value Ref Range    Lipase 11 11 - 82 U/L   ESTIMATED GFR    Collection Time: 18 11:43 AM   Result Value Ref Range    GFR If African American >60 >60 mL/min/1.73 m 2    GFR If Non African American 52 (A) >60 mL/min/1.73 m 2   EKG (ER)    Collection Time: 18 11:54 AM   Result Value Ref Range    Report       [facility] Emergency Dept.    Test Date:  2018  Pt Name:    FERCHO ÁLVAREZ           Department: ER  MRN:        [id number]                      Room:       [facility]  Gender:     Female                       Technician: [id number]  :        1945                   Requested By:MARIANA DANIELLE  Order #:    [id number]                    Reading MD: MARIANA DANIELLE MD    Measurements  Intervals                                Axis  Rate:       68                           P:          58  NY:         180                          QRS:        8  QRSD:       86                           T:          18  QT:         416  QTc:        443    Interpretive Statements  SINUS RHYTHM  LOW VOLTAGE IN FRONTAL LEADS  No previous ECG available for comparison    Electronically Signed On 2018 11:59:24 PDT by MARIANA DANIELLE MD         Imaging/Procedures Review:    Reviewed    MDM (Assessment and Plan):     Active Hospital Problems    Diagnosis   • Hydronephrosis with urinary obstruction due to renal calculus [N13.2]     Priority: High   • Abnormal urinalysis [R82.90]     Priority: Medium   • Tobacco use [Z72.0]     Priority: Low   • Dyslipidemia [E78.5]     Priority: Low   • Acquired hypothyroidism [E03.9]     Priority: Low     NPO for IR neph tube placement  L nephrostomy tube ordered to assist with drainage and with the intent of future PCNL for the treatment of a 2.2 x 1.6 cm obstructing stone.  Continue ABX   Cultures pending.    Plan of care directed by MD De Los Santos

## 2018-08-29 ENCOUNTER — APPOINTMENT (OUTPATIENT)
Dept: RADIOLOGY | Facility: MEDICAL CENTER | Age: 73
DRG: 694 | End: 2018-08-29
Attending: NURSE PRACTITIONER
Payer: MEDICARE

## 2018-08-29 LAB
INR PPP: 1.2 (ref 0.87–1.13)
PROTHROMBIN TIME: 14.9 SEC (ref 12–14.6)

## 2018-08-29 PROCEDURE — 700102 HCHG RX REV CODE 250 W/ 637 OVERRIDE(OP): Performed by: HOSPITALIST

## 2018-08-29 PROCEDURE — 36415 COLL VENOUS BLD VENIPUNCTURE: CPT

## 2018-08-29 PROCEDURE — 700111 HCHG RX REV CODE 636 W/ 250 OVERRIDE (IP): Performed by: HOSPITALIST

## 2018-08-29 PROCEDURE — 700105 HCHG RX REV CODE 258: Performed by: HOSPITALIST

## 2018-08-29 PROCEDURE — 770006 HCHG ROOM/CARE - MED/SURG/GYN SEMI*

## 2018-08-29 PROCEDURE — 85610 PROTHROMBIN TIME: CPT

## 2018-08-29 PROCEDURE — 700111 HCHG RX REV CODE 636 W/ 250 OVERRIDE (IP): Performed by: NURSE PRACTITIONER

## 2018-08-29 PROCEDURE — 700105 HCHG RX REV CODE 258: Performed by: NURSE PRACTITIONER

## 2018-08-29 PROCEDURE — 99232 SBSQ HOSP IP/OBS MODERATE 35: CPT | Performed by: HOSPITALIST

## 2018-08-29 PROCEDURE — A9270 NON-COVERED ITEM OR SERVICE: HCPCS | Performed by: HOSPITALIST

## 2018-08-29 RX ADMIN — ACETAMINOPHEN 650 MG: 325 TABLET, FILM COATED ORAL at 02:42

## 2018-08-29 RX ADMIN — GABAPENTIN 800 MG: 400 CAPSULE ORAL at 18:43

## 2018-08-29 RX ADMIN — GABAPENTIN 800 MG: 400 CAPSULE ORAL at 05:19

## 2018-08-29 RX ADMIN — PRAVASTATIN SODIUM 80 MG: 20 TABLET ORAL at 20:19

## 2018-08-29 RX ADMIN — Medication 2 TABLET: at 05:18

## 2018-08-29 RX ADMIN — BACLOFEN 20 MG: 10 TABLET ORAL at 20:19

## 2018-08-29 RX ADMIN — BACLOFEN 10 MG: 10 TABLET ORAL at 05:21

## 2018-08-29 RX ADMIN — LEVOTHYROXINE SODIUM 75 MCG: 75 TABLET ORAL at 05:18

## 2018-08-29 RX ADMIN — KETOROLAC TROMETHAMINE 15 MG: 30 INJECTION, SOLUTION INTRAMUSCULAR at 12:27

## 2018-08-29 RX ADMIN — SODIUM CHLORIDE: 9 INJECTION, SOLUTION INTRAVENOUS at 09:06

## 2018-08-29 RX ADMIN — GABAPENTIN 800 MG: 400 CAPSULE ORAL at 12:27

## 2018-08-29 RX ADMIN — CEFTRIAXONE SODIUM 1 G: 1 INJECTION, POWDER, FOR SOLUTION INTRAMUSCULAR; INTRAVENOUS at 12:27

## 2018-08-29 RX ADMIN — Medication 2 TABLET: at 18:44

## 2018-08-29 ASSESSMENT — ENCOUNTER SYMPTOMS
NAUSEA: 0
FEVER: 0
VOMITING: 0
MYALGIAS: 0
EYES NEGATIVE: 1
ABDOMINAL PAIN: 1
CHILLS: 0
DEPRESSION: 0
SHORTNESS OF BREATH: 0
SORE THROAT: 0
FLANK PAIN: 1
SPUTUM PRODUCTION: 0
FALLS: 0
COUGH: 0
HEADACHES: 0
WEAKNESS: 0
DIZZINESS: 0
CONSTIPATION: 0

## 2018-08-29 ASSESSMENT — PAIN SCALES - GENERAL
PAINLEVEL_OUTOF10: 3
PAINLEVEL_OUTOF10: 0

## 2018-08-29 NOTE — PROGRESS NOTES
Patient arrived on unit at 1430. 2 RN skin check performed with CLAUDETTE Johnson. No issues. All skin pink and blanchable with a healing scar to midline back.

## 2018-08-29 NOTE — FACE TO FACE
Face to Face Supporting Documentation - Home Health    The encounter with this patient was in whole or in part the primary reason for home health admission.    Date of encounter:   Patient:                    MRN:                       YOB: 2019  Shanelle Unger  [id number]  1945     Home health to see patient for:  Skilled Nursing care for assessment, interventions & education    Skilled need for:  Comment: hydronephrosis s/p nephrostomy tube placement    Skilled nursing interventions to include:  Line/Drain/Airway education and care    Homebound status evidenced by:  Needs the assistance of another person in order to leave the home. Leaving home requires a considerable and taxing effort. There is a normal inability to leave the home.    Community Physician to provide follow up care: Madhav Rasmussen M.D.     Optional Interventions? No      I certify the face to face encounter for this home health care referral meets the CMS requirements and the encounter/clinical assessment with the patient was, in whole, or in part, for the medical condition(s) listed above, which is the primary reason for home health care. Based on my clinical findings: the service(s) are medically necessary, support the need for home health care, and the homebound criteria are met.  I certify that this patient has had a face to face encounter by myself.  Reshma Apodaca M.D. - NPI: [id number]

## 2018-08-29 NOTE — PROGRESS NOTES
Received report and assumed patient care at 1900. A&Ox4, running NS @ 125, pain reported of 5. Toradol provided. All needs met at this time.     Patients bed is locked and in lowest position, call light and bedside table are within reach, treaded slipper socks are on, and hourly rounding in place.

## 2018-08-29 NOTE — CONSULTS
DATE OF SERVICE:  08/28/2018    UROLOGY CONSULTATION NOTE    REASON FOR CONSULTATION:  Urology service was consulted by Dr. العراقي of the   emergency department for advice and opinion regarding this woman's left kidney   stone.    HISTORY OF PRESENT ILLNESS:  The patient is 73 years old and was in her state   of usual good health when she had the acute onset of left-sided flank pain.    Patient states she has had a very remote history of kidney stones.  She   presented to the emergency department for this issue.  There was no gross   hematuria, no fevers, no chills, no dysuria.  No history of recurrent urinary   tract infection.  Nothing makes the pain better or worse.  It has diminished   since she has been admitted to the hospital.    A CT scan was performed, which demonstrated a 2.2 x 1.6 cm obstructing stone   in the left renal pelvis with moderate left hydronephrosis.    For detailed account of the patient's past medical, surgical, social history   and review of systems, please see Dr. Tripathi's dictated history and physical   today in the patient's chart.    PHYSICAL EXAMINATION:  GENERAL:  Well-nourished, well-developed, pleasant woman, lying in [facility] in no acute distress.  VITAL SIGNS:  Temperature 36.7, pulse 79, blood pressure 126/69.  HEENT:  Oropharynx is clear.  NECK:  No cervical lymphadenopathy.  CHEST:  Rises symmetric.  HEART:  Regular, 2+ radial pulses bilaterally.  SKIN:  Warm and dry.  NEUROLOGIC:  Grossly intact.  EXTREMITIES:  No lower extremity edema.  ABDOMEN:  Soft, nontender, nondistended.  There is suprapubic fullness or   tenderness.    LABORATORY DATA:  White blood cell count 28,000, hematocrit 41, creatinine   1.03.  Urinalysis; positive nitrites, many red cells and white cells and many   bacteria.    IMAGING:  CT scan images as above.    IMPRESSION AND PLAN:  A 73-year-old woman with a very large left renal pelvis   stone.  Ultimately, she will need a percutaneous  nephrolithotomy for treatment   of this.  Given the pain, which at present time is actually well controlled   and the possibility for infection, though there is no indication at the   present time at all for obstructed pyelonephritis, I have advocated for   placement of a left nephrostomy tube for gravity drainage.    It is my understanding that the interventional service will not be doing this   until tomorrow.  I would strongly advocate should she have any decompensation   in terms of vital signs, pain or systemic symptoms of worsening   pyelonephritis, this will be done in an acute basis.    She understands the multistep course of treatment that will be indicated for   this very large stone and wished to proceed.  We will continue to follow along   while she is here.       ____________________________________     Antonio De Los Santos MD MCM / NTS    DD:  08/28/2018 19:05:37  DT:  08/28/2018 23:23:46    D#:  [id number]  Job#:  [id number]

## 2018-08-29 NOTE — PROGRESS NOTES
The interventional radiology procedure left nephrostomy tube placement has been scheduled for tomorrow 8/30.  Please update patient and family to plan of care.  Notified bedside RN of pre procedure needs, NPO, Consent for Radiology Procedure, IV, current labs to include PT/INR, anti-coagulants held.  Any questions please call [phone].

## 2018-08-29 NOTE — CARE PLAN
Problem: Safety  Goal: Will remain free from falls  Outcome: PROGRESSING AS EXPECTED  Bed is locked and in lowest position, call light and bedside table are within reach, treaded slipper socks are on, and hourly rounding is in place.     Problem: Pain Management  Goal: Pain level will decrease to patient's comfort goal  Outcome: PROGRESSING AS EXPECTED  PRN pain meds provided as needed for pain.

## 2018-08-29 NOTE — PROGRESS NOTES
Patient arrived on unit at 1430. Patient assessed and all needs meet. Patient complains of tenderness to abdomen and left flank, but refusing pain medication currently. Patient steady. Up walking to the bathroom. 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 Patient planned for left sided nephrostomy tube placement tomorrow.-consent in chart (unsigned). NPO after midnight-patient notified. Patient wears hearing aids and dentures, but did not bring them into the hospital. All belongings and call light within reach. Monitoring to continue.

## 2018-08-29 NOTE — PROGRESS NOTES
Patient alert and oriented. Patient assessed and all needs met. Patient kept NPO for nephrology tube placement. Patient denies pain. IV fluids maintained. Patient calls for assistance to the bathroom. Patient voiding with no issues. IR called and stated that they would take patient for procedure around [id number]. Patient updated on plan of care. All questions answered. Patient did not feel comfortable to sign consent for procedure at current time. Consent in chart. All belongings and call light within reach. Monitoring to continue.

## 2018-08-29 NOTE — PROGRESS NOTES
[facility] Hospitalist Progress Note    Date of Service: 2018    Chief Complaint  73 y.o. female admitted 2018 with left flank and abdominal pain.   Patient has history of renal calculi several years ago. Was transferred from outlying facility for Urology evaluation and further management.     Interval Problem Update  - Patient states pain to left flank is controlled at this time due to pain medications. Only complaint at this time is that she is hungry.  Pending nephrostomy tube placement this afternoon with Urology/IR. NPO at this time. IV rocephin initiated based on UA results collected here. Will continue to watch closely and anticipate discharge soon with close outpatient follow up with urology. Patient remains afebrile.     Consultants/Specialty  Urology- Dr. De Los Santos    Disposition  Likely home with no needs and close outpatient follow up         Review of Systems   Constitutional: Negative for chills and fever.   HENT: Negative for congestion and sore throat.    Eyes: Negative.    Respiratory: Negative for cough, sputum production and shortness of breath.    Cardiovascular: Negative for chest pain and leg swelling.   Gastrointestinal: Positive for abdominal pain (intermittent ). Negative for constipation, nausea and vomiting.   Genitourinary: Positive for flank pain (left side, but controlled with medications ). Negative for dysuria and urgency.   Musculoskeletal: Negative for falls and myalgias.   Skin: Negative for rash.   Neurological: Negative for dizziness, weakness and headaches.   Psychiatric/Behavioral: Negative for depression.      Physical Exam  Laboratory/Imaging   Hemodynamics  Temp (24hrs), Av.8 °C (98.2 °F), Min:36.7 °C (98 °F), Max:37 °C (98.6 °F)   Temperature: 37 °C (98.6 °F)  Pulse  Av.2  Min: 67  Max: 86 Heart Rate (Monitored): 72  Blood Pressure : (!) 99/55, NIBP: (!) 99/48      Respiratory      Respiration: 16, Pulse Oximetry: 92 %             Fluids    Intake/Output  Summary (Last 24 hours) at 08/29/18 1048  Last data filed at 08/29/18 0800   Gross per 24 hour   Intake                0 ml   Output                0 ml   Net                0 ml       Nutrition  Orders Placed This Encounter   Procedures   • DIET NPO     Standing Status:   Standing     Number of Occurrences:   1     Order Specific Question:   Restrict to:     Answer:   Sips with Medications [3]     Physical Exam   Constitutional: She is oriented to person, place, and time. She appears well-developed. She is active and cooperative. No distress.   HENT:   Head: Normocephalic.   Eyes: Conjunctivae and lids are normal.   Neck: Neck supple. No tracheal tenderness present.   Cardiovascular: Normal rate, regular rhythm and normal heart sounds.  Exam reveals no gallop.    Pulmonary/Chest: Effort normal and breath sounds normal. No respiratory distress. She has no decreased breath sounds. She has no wheezes.   Abdominal: Soft. Normal appearance. She exhibits no distension. Bowel sounds are decreased. There is no tenderness. There is no guarding.   Musculoskeletal:   Equal strength    Neurological: She is alert and oriented to person, place, and time. She has normal strength. Gait normal.   Skin: Skin is warm and dry. She is not diaphoretic.   Psychiatric: She has a normal mood and affect. Her speech is normal and behavior is normal. Judgment normal. Cognition and memory are normal.   Nursing note and vitals reviewed.      Recent Labs      08/28/18   1143   WBC  28.2*   RBC  4.52   HEMOGLOBIN  13.2   HEMATOCRIT  41.8   MCV  92.5   MCH  29.2   MCHC  31.6*   RDW  50.6*   PLATELETCT  173   MPV  11.6     Recent Labs      08/28/18   1143   SODIUM  141   POTASSIUM  3.8   CHLORIDE  108   CO2  24   GLUCOSE  142*   BUN  23*   CREATININE  1.03   CALCIUM  9.1     Recent Labs      08/29/18   0922   INR  1.20*                  Assessment/Plan     Hydronephrosis with urinary obstruction due to renal calculus- (present on admission)    Assessment & Plan    Dr. Antonio Pillai, urologist, consulted by emergency room M.D.  -IV fluids normal saline 125 ML's per hour  -Pain control with Toradol when necessary and morphine as needed  -Baclofen every 8 hours as needed  PRN antiemetics   Scheduled for nephrostomy tube placement this afternoon   NPO at this time         Abnormal urinalysis- (present on admission)   Assessment & Plan    Patient denies any dysuria or recent frequency   UA showed blood, leukocyte Estrace positive, positive bacteria but no significant leukocytosis  IV Rocephin initiated   Nephrostomy tube to be placed today         Acquired hypothyroidism- (present on admission)   Assessment & Plan    Continue with active treatment with levothyroxine        Dyslipidemia- (present on admission)   Assessment & Plan    Continue with pravastatin for ongoing active treatment        Tobacco use- (present on admission)   Assessment & Plan    Smoking cessation discussed the patient has no interest in quitting. She did not wish to continue any further discussion on the matter.  States she smokes 6 cigarettes a day.  Nicotine patch if so required          Quality-Core Measures   Reviewed items::  Labs reviewed and Medications reviewed  Cunningham catheter::  No Cunningham  DVT prophylaxis pharmacological::  Enoxaparin (Lovenox)  Ulcer Prophylaxis::  Not indicated  Antibiotics:  Treating active infection/contamination beyond 24 hours perioperative coverage

## 2018-08-29 NOTE — PROGRESS NOTES
IR called multiple times through the day for status on patient procedure. IR stated that they cannot perform procedure today. Suad Gordon called to update and obtain diet orders-orders obtained. Early tray called for patient.

## 2018-08-29 NOTE — PROGRESS NOTES
"Urology Progress Note      Overnight Events: None    S: No fevers, chills, nausea or vomiting.  Currently NPO for left nephrostomy tube placement.    O:   Blood pressure (!) 99/55, pulse 77, temperature 37 °C (98.6 °F), resp. rate 16, height 1.575 m (5' 2\"), weight 54.4 kg (120 lb), SpO2 92 %, not currently breastfeeding.  Recent Labs      08/28/18   1143   SODIUM  141   POTASSIUM  3.8   CHLORIDE  108   CO2  24   GLUCOSE  142*   BUN  23*   CREATININE  1.03   CALCIUM  9.1     Recent Labs      08/28/18   1143   WBC  28.2*   RBC  4.52   HEMOGLOBIN  13.2   HEMATOCRIT  41.8   MCV  92.5   MCH  29.2   MCHC  31.6*   RDW  50.6*   PLATELETCT  173   MPV  11.6         Intake/Output Summary (Last 24 hours) at 08/29/18 1030  Last data filed at 08/29/18 0800   Gross per 24 hour   Intake                0 ml   Output                0 ml   Net                0 ml       Exam:  Abdomen soft, benign.    Urine: clear      A/P:    Active Hospital Problems    Diagnosis   • Hydronephrosis with urinary obstruction due to renal calculus [N13.2]     Priority: High   • Abnormal urinalysis [R82.90]     Priority: Medium   • Tobacco use [Z72.0]     Priority: Low   • Dyslipidemia [E78.5]     Priority: Low   • Acquired hypothyroidism [E03.9]     Priority: Low       Will go home with nephrostomy tube and f/u with Dr De Los Santos for PCNL  "

## 2018-08-29 NOTE — CARE PLAN
Problem: Safety  Goal: Will remain free from injury  Outcome: PROGRESSING AS EXPECTED  All safety precautions in place. No evidence of fall or harm.

## 2018-08-30 ENCOUNTER — APPOINTMENT (OUTPATIENT)
Dept: RADIOLOGY | Facility: MEDICAL CENTER | Age: 73
DRG: 694 | End: 2018-08-30
Attending: NURSE PRACTITIONER
Payer: MEDICARE

## 2018-08-30 ENCOUNTER — APPOINTMENT (OUTPATIENT)
Dept: RADIOLOGY | Facility: MEDICAL CENTER | Age: 73
DRG: 694 | End: 2018-08-30
Attending: HOSPITALIST
Payer: MEDICARE

## 2018-08-30 LAB
ANION GAP SERPL CALC-SCNC: 8 MMOL/L (ref 0–11.9)
BACTERIA UR CULT: ABNORMAL
BACTERIA UR CULT: ABNORMAL
BASOPHILS # BLD AUTO: 0.3 % (ref 0–1.8)
BASOPHILS # BLD: 0.04 K/UL (ref 0–0.12)
BUN SERPL-MCNC: 20 MG/DL (ref 8–22)
CALCIUM SERPL-MCNC: 8.2 MG/DL (ref 8.5–10.5)
CHLORIDE SERPL-SCNC: 109 MMOL/L (ref 96–112)
CO2 SERPL-SCNC: 21 MMOL/L (ref 20–33)
CREAT SERPL-MCNC: 0.83 MG/DL (ref 0.5–1.4)
EKG IMPRESSION: NORMAL
EOSINOPHIL # BLD AUTO: 0.1 K/UL (ref 0–0.51)
EOSINOPHIL NFR BLD: 0.8 % (ref 0–6.9)
ERYTHROCYTE [DISTWIDTH] IN BLOOD BY AUTOMATED COUNT: 50.6 FL (ref 35.9–50)
GLUCOSE SERPL-MCNC: 90 MG/DL (ref 65–99)
HCT VFR BLD AUTO: 34.8 % (ref 37–47)
HGB BLD-MCNC: 11.1 G/DL (ref 12–16)
IMM GRANULOCYTES # BLD AUTO: 0.06 K/UL (ref 0–0.11)
IMM GRANULOCYTES NFR BLD AUTO: 0.5 % (ref 0–0.9)
LACTATE BLD-SCNC: 1.2 MMOL/L (ref 0.5–2)
LYMPHOCYTES # BLD AUTO: 1.13 K/UL (ref 1–4.8)
LYMPHOCYTES NFR BLD: 8.6 % (ref 22–41)
MCH RBC QN AUTO: 29.2 PG (ref 27–33)
MCHC RBC AUTO-ENTMCNC: 31.9 G/DL (ref 33.6–35)
MCV RBC AUTO: 91.6 FL (ref 81.4–97.8)
MONOCYTES # BLD AUTO: 1.26 K/UL (ref 0–0.85)
MONOCYTES NFR BLD AUTO: 9.6 % (ref 0–13.4)
NEUTROPHILS # BLD AUTO: 10.49 K/UL (ref 2–7.15)
NEUTROPHILS NFR BLD: 80.2 % (ref 44–72)
NRBC # BLD AUTO: 0 K/UL
NRBC BLD-RTO: 0 /100 WBC
PLATELET # BLD AUTO: 127 K/UL (ref 164–446)
PMV BLD AUTO: 11.6 FL (ref 9–12.9)
POTASSIUM SERPL-SCNC: 3.6 MMOL/L (ref 3.6–5.5)
RBC # BLD AUTO: 3.8 M/UL (ref 4.2–5.4)
SIGNIFICANT IND 70042: ABNORMAL
SITE SITE: ABNORMAL
SODIUM SERPL-SCNC: 138 MMOL/L (ref 135–145)
SOURCE SOURCE: ABNORMAL
WBC # BLD AUTO: 13.1 K/UL (ref 4.8–10.8)

## 2018-08-30 PROCEDURE — 36415 COLL VENOUS BLD VENIPUNCTURE: CPT

## 2018-08-30 PROCEDURE — 80048 BASIC METABOLIC PNL TOTAL CA: CPT

## 2018-08-30 PROCEDURE — 99233 SBSQ HOSP IP/OBS HIGH 50: CPT | Performed by: HOSPITALIST

## 2018-08-30 PROCEDURE — 700111 HCHG RX REV CODE 636 W/ 250 OVERRIDE (IP): Performed by: RADIOLOGY

## 2018-08-30 PROCEDURE — 700105 HCHG RX REV CODE 258: Performed by: NURSE PRACTITIONER

## 2018-08-30 PROCEDURE — 93005 ELECTROCARDIOGRAM TRACING: CPT | Performed by: HOSPITALIST

## 2018-08-30 PROCEDURE — 87040 BLOOD CULTURE FOR BACTERIA: CPT | Mod: 91

## 2018-08-30 PROCEDURE — 93010 ELECTROCARDIOGRAM REPORT: CPT | Performed by: INTERNAL MEDICINE

## 2018-08-30 PROCEDURE — 770006 HCHG ROOM/CARE - MED/SURG/GYN SEMI*

## 2018-08-30 PROCEDURE — BT121ZZ FLUOROSCOPY OF LEFT KIDNEY USING LOW OSMOLAR CONTRAST: ICD-10-PCS | Performed by: RADIOLOGY

## 2018-08-30 PROCEDURE — 87186 SC STD MICRODIL/AGAR DIL: CPT

## 2018-08-30 PROCEDURE — 85025 COMPLETE CBC W/AUTO DIFF WBC: CPT

## 2018-08-30 PROCEDURE — 700105 HCHG RX REV CODE 258: Performed by: HOSPITALIST

## 2018-08-30 PROCEDURE — C1729 CATH, DRAINAGE: HCPCS

## 2018-08-30 PROCEDURE — A9270 NON-COVERED ITEM OR SERVICE: HCPCS | Performed by: HOSPITALIST

## 2018-08-30 PROCEDURE — 87086 URINE CULTURE/COLONY COUNT: CPT

## 2018-08-30 PROCEDURE — 71045 X-RAY EXAM CHEST 1 VIEW: CPT

## 2018-08-30 PROCEDURE — 87077 CULTURE AEROBIC IDENTIFY: CPT

## 2018-08-30 PROCEDURE — 700102 HCHG RX REV CODE 250 W/ 637 OVERRIDE(OP): Performed by: HOSPITALIST

## 2018-08-30 PROCEDURE — 700111 HCHG RX REV CODE 636 W/ 250 OVERRIDE (IP): Performed by: NURSE PRACTITIONER

## 2018-08-30 PROCEDURE — 0T9430Z DRAINAGE OF LEFT KIDNEY PELVIS WITH DRAINAGE DEVICE, PERCUTANEOUS APPROACH: ICD-10-PCS | Performed by: RADIOLOGY

## 2018-08-30 PROCEDURE — 83605 ASSAY OF LACTIC ACID: CPT

## 2018-08-30 PROCEDURE — 700111 HCHG RX REV CODE 636 W/ 250 OVERRIDE (IP)

## 2018-08-30 RX ORDER — SODIUM CHLORIDE 9 MG/ML
500 INJECTION, SOLUTION INTRAVENOUS ONCE
Status: COMPLETED | OUTPATIENT
Start: 2018-08-30 | End: 2018-08-30

## 2018-08-30 RX ORDER — ONDANSETRON 2 MG/ML
4 INJECTION INTRAMUSCULAR; INTRAVENOUS PRN
Status: ACTIVE | OUTPATIENT
Start: 2018-08-30 | End: 2018-08-30

## 2018-08-30 RX ORDER — SODIUM CHLORIDE 9 MG/ML
500 INJECTION, SOLUTION INTRAVENOUS
Status: ACTIVE | OUTPATIENT
Start: 2018-08-30 | End: 2018-08-30

## 2018-08-30 RX ORDER — MIDAZOLAM HYDROCHLORIDE 1 MG/ML
.5-2 INJECTION INTRAMUSCULAR; INTRAVENOUS PRN
Status: ACTIVE | OUTPATIENT
Start: 2018-08-30 | End: 2018-08-30

## 2018-08-30 RX ORDER — MIDAZOLAM HYDROCHLORIDE 1 MG/ML
INJECTION INTRAMUSCULAR; INTRAVENOUS
Status: COMPLETED
Start: 2018-08-30 | End: 2018-08-30

## 2018-08-30 RX ADMIN — PRAVASTATIN SODIUM 80 MG: 20 TABLET ORAL at 20:36

## 2018-08-30 RX ADMIN — FENTANYL CITRATE 50 MCG: 50 INJECTION, SOLUTION INTRAMUSCULAR; INTRAVENOUS at 16:21

## 2018-08-30 RX ADMIN — BACLOFEN 10 MG: 10 TABLET ORAL at 05:32

## 2018-08-30 RX ADMIN — GABAPENTIN 800 MG: 400 CAPSULE ORAL at 12:38

## 2018-08-30 RX ADMIN — LEVOTHYROXINE SODIUM 75 MCG: 75 TABLET ORAL at 05:32

## 2018-08-30 RX ADMIN — MIDAZOLAM 1 MG: 1 INJECTION INTRAMUSCULAR; INTRAVENOUS at 15:59

## 2018-08-30 RX ADMIN — GABAPENTIN 800 MG: 400 CAPSULE ORAL at 05:32

## 2018-08-30 RX ADMIN — FENTANYL CITRATE 50 MCG: 50 INJECTION, SOLUTION INTRAMUSCULAR; INTRAVENOUS at 16:02

## 2018-08-30 RX ADMIN — Medication 2 TABLET: at 05:32

## 2018-08-30 RX ADMIN — MIDAZOLAM 1 MG: 1 INJECTION INTRAMUSCULAR; INTRAVENOUS at 16:10

## 2018-08-30 RX ADMIN — SODIUM CHLORIDE 500 ML: 9 INJECTION, SOLUTION INTRAVENOUS at 18:05

## 2018-08-30 RX ADMIN — CEFTRIAXONE SODIUM 1 G: 1 INJECTION, POWDER, FOR SOLUTION INTRAMUSCULAR; INTRAVENOUS at 05:31

## 2018-08-30 RX ADMIN — MIDAZOLAM 1 MG: 1 INJECTION INTRAMUSCULAR; INTRAVENOUS at 16:23

## 2018-08-30 ASSESSMENT — ENCOUNTER SYMPTOMS
DIZZINESS: 0
CONSTIPATION: 0
EYES NEGATIVE: 1
ABDOMINAL PAIN: 0
FLANK PAIN: 1
SORE THROAT: 0
FEVER: 0
VOMITING: 0
CHILLS: 0
NAUSEA: 0
SPUTUM PRODUCTION: 0
COUGH: 0
FALLS: 0
MYALGIAS: 0
DEPRESSION: 0
HEADACHES: 0
WEAKNESS: 0
SHORTNESS OF BREATH: 0

## 2018-08-30 ASSESSMENT — PAIN SCALES - GENERAL
PAINLEVEL_OUTOF10: 0
PAINLEVEL_OUTOF10: 0
PAINLEVEL_OUTOF10: 2
PAINLEVEL_OUTOF10: 3

## 2018-08-30 NOTE — DISCHARGE PLANNING
Anticipated Discharge Disposition: Home with H/H    Action: Patient is ready to go home and is open to receiving H/H services.    Faxed Choice to [facility]    Barriers to Discharge: H/H acceptance    Plan: follow up with [facility] on H/H referral

## 2018-08-30 NOTE — PROGRESS NOTES
IR Procedure Note:    Patient brought to IR2.  Patient A&Ox4, on room air and in no apparent distress.  Patient consented by Dr Palacios and all questions answered.  Dr. Palacios completed left nephrostomy tube placement.  The patient tolerated the procedure well; ETCo2 [id number], with consistent waveform during the procedure.  Gauze and medipore tape applied to drain, CDI.  Patient alert and oriented and verbally appropriate post procedure, vital signs stable, see flow sheet.  Report called to CLAUDETTE Centeno and patient taken to room by CLAUDETTE Espinosa.    Theorem Flexima Regular Nephrostomy 8F x 25cm  REF [id number]  LOT [id number]

## 2018-08-30 NOTE — DISCHARGE PLANNING
Agency/Facility Name: [facility]  Spoke To: Celia  Outcome: Notified Celia of pt's physical address ([phone] [facility]).

## 2018-08-30 NOTE — PROGRESS NOTES
Pt Aox4, pt stating decreased pain. IR called for updated pt neph tube placement, no time available at this time. Pt strict NPO at this time. Pt has NS running at 125 ml/hr. Pt on RA. Pt up with stand by assist.

## 2018-08-30 NOTE — PROGRESS NOTES
[facility] Hospitalist Progress Note    Date of Service: 8/30/2018    Chief Complaint  73 y.o. female admitted 8/28/2018 with left flank and abdominal pain.   Patient has history of renal calculi several years ago. Was transferred from outlying facility for Urology evaluation and further management.     Interval Problem Update  8/29- Patient states pain to left flank is controlled at this time due to pain medications. Only complaint at this time is that she is hungry.  Pending nephrostomy tube placement this afternoon with Urology/IR. NPO at this time. IV rocephin initiated based on UA results collected here. Will continue to watch closely and anticipate discharge soon with close outpatient follow up with urology. Patient remains afebrile.   8/30- Patient states overall she feels well, little left flank pain that had increased overnight. Otherwise, she is just frustrated with procedure being moved and is hungry again. Rocephin IV to continue- UA culture shows positive for E. Coli, sensitive to Rocephin. Will transition to PO on discharge. Patient has questions for Urology. Hopeful for nephrostomy tube placement this afternoon. Home health to help with dressing changes has been ordered.     Consultants/Specialty  Urology- Dr. De Los Santos    Disposition  Likely home with no needs and close outpatient follow up         Review of Systems   Constitutional: Negative for chills and fever.   HENT: Negative for congestion and sore throat.    Eyes: Negative.    Respiratory: Negative for cough, sputum production and shortness of breath.    Cardiovascular: Negative for chest pain and leg swelling.   Gastrointestinal: Negative for abdominal pain, constipation, nausea and vomiting.   Genitourinary: Positive for flank pain (left side, but controlled with medications ). Negative for dysuria and urgency.   Musculoskeletal: Negative for falls and myalgias.   Skin: Negative for rash.   Neurological: Negative for dizziness, weakness and  headaches.   Psychiatric/Behavioral: Negative for depression.      Physical Exam  Laboratory/Imaging   Hemodynamics  Temp (24hrs), Av.8 °C (98.3 °F), Min:36.4 °C (97.6 °F), Max:37.2 °C (98.9 °F)   Temperature: 36.8 °C (98.2 °F)  Pulse  Av.6  Min: 67  Max: 86    Blood Pressure : 122/59      Respiratory      Respiration: 16, Pulse Oximetry: 93 %             Fluids    Intake/Output Summary (Last 24 hours) at 18 1019  Last data filed at 18 0900   Gross per 24 hour   Intake              480 ml   Output                0 ml   Net              480 ml       Nutrition  Orders Placed This Encounter   Procedures   • DIET NPO     Standing Status:   Standing     Number of Occurrences:   1     Order Specific Question:   Restrict to:     Answer:   Strict [1]     Physical Exam   Constitutional: She is oriented to person, place, and time. She appears well-developed. She is active and cooperative. No distress.   HENT:   Head: Normocephalic.   Eyes: Conjunctivae and lids are normal.   Neck: Neck supple. No tracheal tenderness present.   Cardiovascular: Normal rate, regular rhythm and normal heart sounds.  Exam reveals no gallop.    No murmur heard.  Pulmonary/Chest: Effort normal and breath sounds normal. No accessory muscle usage. No respiratory distress. She has no decreased breath sounds. She has no wheezes.   Abdominal: Soft. Normal appearance. She exhibits no distension. Bowel sounds are decreased. There is no tenderness.   Musculoskeletal:   Equal strength    Neurological: She is alert and oriented to person, place, and time. She has normal strength. Gait normal.   Skin: Skin is warm and dry. She is not diaphoretic.   Psychiatric: She has a normal mood and affect. Her speech is normal and behavior is normal. Judgment normal. Cognition and memory are normal.   Patient states she is frustrated with procedure being moved    Nursing note and vitals reviewed.      Recent Labs      18   1143  18   0223    WBC  28.2*  13.1*   RBC  4.52  3.80*   HEMOGLOBIN  13.2  11.1*   HEMATOCRIT  41.8  34.8*   MCV  92.5  91.6   MCH  29.2  29.2   MCHC  31.6*  31.9*   RDW  50.6*  50.6*   PLATELETCT  173  127*   MPV  11.6  11.6     Recent Labs      08/28/18   1143  08/30/18   0223   SODIUM  141  138   POTASSIUM  3.8  3.6   CHLORIDE  108  109   CO2  24  21   GLUCOSE  142*  90   BUN  23*  20   CREATININE  1.03  0.83   CALCIUM  9.1  8.2*     Recent Labs      08/29/18   0922   INR  1.20*                  Assessment/Plan     Hydronephrosis with urinary obstruction due to renal calculus- (present on admission)   Assessment & Plan    Dr. Antonio Pillai, urologist, consulted by emergency room [facility].  -IV fluids normal saline 125 ML's per hour  -Pain control with Toradol when necessary and morphine as needed  -Baclofen every 8 hours as needed  PRN antiemetics   Nephrostomy tube placement moved to today    NPO at this time         Abnormal urinalysis- (present on admission)   Assessment & Plan    Patient denies any dysuria or recent frequency   UA showed blood, leukocyte Estrace positive, positive bacteria but no significant leukocytosis  IV Rocephin initiated   Nephrostomy tube to be placed today         Acquired hypothyroidism- (present on admission)   Assessment & Plan    Continue with active treatment with levothyroxine        Dyslipidemia- (present on admission)   Assessment & Plan    Continue with pravastatin for ongoing active treatment        Tobacco use- (present on admission)   Assessment & Plan    Smoking cessation discussed the patient has no interest in quitting. She did not wish to continue any further discussion on the matter.  States she smokes 6 cigarettes a day.  Nicotine patch if so required          Quality-Core Measures   Reviewed items::  Labs reviewed and Medications reviewed  Cunningham catheter::  No Cunningham  DVT prophylaxis pharmacological::  Enoxaparin (Lovenox) (held for procedure this am)  Ulcer Prophylaxis::  Not  indicated  Antibiotics:  Treating active infection/contamination beyond 24 hours perioperative coverage

## 2018-08-30 NOTE — OR SURGEON
Immediate Post- Operative Note    PostOp Diagnosis: RENAL OBSTRUCTION, UTI, LARGE LEFT RENAL PELVIS CALCULUS      Procedure(s): LEFT PERCUTANEOUS NEPHROSTOMY PLACEMENT AND NEPHROSTOGRAM WITH U/S AND FLUOROSCOPIC GUIDANCE    8F LOCKING LOOP IN RENAL PELVIS. NOTE, CATHETER SLIGHTLY DISPLACED TOWARD LATERAL RENAL PELVIS DUE TO LARGE STONE OCCUPYING PELVIS.    OMNIPAQUE 300, 100ML IV TO VIZ RENAL COLLECTING SYSTEM WHICH IS NOT SIGNIFICANTLY DILATED.        Estimated Blood Loss: <10CC        Complications: NONE          8/30/2018     4:32 PM     Navi Palacios

## 2018-08-30 NOTE — DISCHARGE PLANNING
Received Choice form at 9:15 AM  Agency/Facility Name: [facility]   Referral sent per Choice form @ 9:15 am

## 2018-08-30 NOTE — DISCHARGE PLANNING
Agency/Facility Name: [facility]  Spoke To: Received fax  Outcome: Accepted, DONNA Gasca notified.

## 2018-08-30 NOTE — DOCUMENTATION QUERY
"DOCUMENTATION QUERY    PROVIDERS: Please select “Cosign w/ note”to reply to query.    To better represent the severity of illness of your patient, please review the following information and exercise your independent professional judgment in responding to this query.     \"Urinary tract infection\" is documented in the ED Note and Urine Culture positive with escherichia coli >100,000 cfu/mL is noted in the Lab Results. Based upon the clinical findings, risk factors, and treatment, please clarify if this diagnosis can be ruled in or ruled out.    • Urinary tract infection has resolved  • Urinary tract infection has been ruled in  • Urinary tract infection has been ruled out  • Other explanation of clinical findings  • Unable to determine    The medical record reflects the following:   Clinical Findings 8/28 ED Note: \"Patient received Toradol, Dilaudid, Pyridium, prior to arrival\" is documented.  8/28 UA: Positive - occult blood; leukocyte esterase; packed WBC's; bacteria  8/28 H&P: Hydronephrosis with urinary obstruction due to renal calculus and abnormal urinalysis\" is documented.   Treatment Urology consult; IVF; rocephin   Risk Factors Hydronephrosis with urinary obstruction; female   Location within medical record History and Physical, Progress Notes, Lab Results and MAR     Thank you,   Lydia Felder RN  Clinical   [phone]          "

## 2018-08-30 NOTE — CARE PLAN
Problem: Safety  Goal: Will remain free from falls  Outcome: PROGRESSING AS EXPECTED  Bed is locked and in lowest position, call light and bedside table are within reach, treaded slipper socks are on, and hourly rounding is in place.     Problem: Infection  Goal: Will remain free from infection  Outcome: PROGRESSING AS EXPECTED  IV abx provided. Showered this evening. Preventative precautions in place.

## 2018-08-30 NOTE — PROGRESS NOTES
"Urology Progress Note      Overnight Events: None    S: Nephrostomy tube not placed yesterday. NPO again for hopeful placement some time today. Upset that she was NPO all day yesterday without getting tube placed.    O:   Blood pressure 122/59, pulse 82, temperature 36.8 °C (98.2 °F), resp. rate 16, height 1.575 m (5' 2\"), weight 54.4 kg (120 lb), SpO2 93 %, not currently breastfeeding.  Recent Labs      08/28/18   1143  08/30/18   0223   SODIUM  141  138   POTASSIUM  3.8  3.6   CHLORIDE  108  109   CO2  24  21   GLUCOSE  142*  90   BUN  23*  20   CREATININE  1.03  0.83   CALCIUM  9.1  8.2*     Recent Labs      08/28/18   1143  08/30/18   0223   WBC  28.2*  13.1*   RBC  4.52  3.80*   HEMOGLOBIN  13.2  11.1*   HEMATOCRIT  41.8  34.8*   MCV  92.5  91.6   MCH  29.2  29.2   MCHC  31.6*  31.9*   RDW  50.6*  50.6*   PLATELETCT  173  127*   MPV  11.6  11.6         Intake/Output Summary (Last 24 hours) at 08/30/18 1154  Last data filed at 08/30/18 0900   Gross per 24 hour   Intake              480 ml   Output                0 ml   Net              480 ml       Exam:  Abdomen soft, benign.   Urine: clear      A/P:    Active Hospital Problems    Diagnosis   • Hydronephrosis with urinary obstruction due to renal calculus [N13.2]     Priority: High   • Abnormal urinalysis [R82.90]     Priority: Medium   • Tobacco use [Z72.0]     Priority: Low   • Dyslipidemia [E78.5]     Priority: Low   • Acquired hypothyroidism [E03.9]     Priority: Low       NPO for nephrostomy tube placement 8/30/18.  "

## 2018-08-30 NOTE — PROGRESS NOTES
Spoke with IR this AM about neph tube placement, no updated time available.     Called IR for updates about Neph tube placement, no answer. Numbers for IR [phone], [phone], [phone]. Will attempt to call again in an hour.

## 2018-08-30 NOTE — CARE PLAN
Problem: Communication  Goal: The ability to communicate needs accurately and effectively will improve  Outcome: PROGRESSING AS EXPECTED  Pt to have a neph tube placed today, No time known according to IR.     Problem: Safety  Goal: Will remain free from injury  Outcome: PROGRESSING AS EXPECTED  Pt one person assist, pt calling appropriately.

## 2018-08-30 NOTE — DISCHARGE PLANNING
Agency/Facility Name: [facility]  Spoke To: Celia  Outcome: Referral has not been reviewed yet, Celia will call once that is completed and a decision has been made.

## 2018-08-30 NOTE — PROGRESS NOTES
Received report and assumed patient care at 1900. A&Ox4, running NS @ 125, pain reported of 3, declined any intervention.  Night time meds provided. All needs met at this time.      Patients bed is locked and in lowest position, call light and bedside table are within reach, treaded slipper socks are on, and hourly rounding in place.

## 2018-08-31 ENCOUNTER — PATIENT OUTREACH (OUTPATIENT)
Dept: HEALTH INFORMATION MANAGEMENT | Facility: OTHER | Age: 73
End: 2018-08-31

## 2018-08-31 PROBLEM — E87.6 HYPOKALEMIA: Status: ACTIVE | Noted: 2018-08-31

## 2018-08-31 PROBLEM — N39.0 UTI (URINARY TRACT INFECTION): Status: ACTIVE | Noted: 2018-08-31

## 2018-08-31 LAB
ANION GAP SERPL CALC-SCNC: 8 MMOL/L (ref 0–11.9)
BASOPHILS # BLD AUTO: 0.3 % (ref 0–1.8)
BASOPHILS # BLD: 0.05 K/UL (ref 0–0.12)
BUN SERPL-MCNC: 10 MG/DL (ref 8–22)
CALCIUM SERPL-MCNC: 7.9 MG/DL (ref 8.5–10.5)
CHLORIDE SERPL-SCNC: 113 MMOL/L (ref 96–112)
CO2 SERPL-SCNC: 20 MMOL/L (ref 20–33)
CREAT SERPL-MCNC: 0.69 MG/DL (ref 0.5–1.4)
EOSINOPHIL # BLD AUTO: 0.02 K/UL (ref 0–0.51)
EOSINOPHIL NFR BLD: 0.1 % (ref 0–6.9)
ERYTHROCYTE [DISTWIDTH] IN BLOOD BY AUTOMATED COUNT: 50.9 FL (ref 35.9–50)
GLUCOSE SERPL-MCNC: 106 MG/DL (ref 65–99)
HCT VFR BLD AUTO: 33.1 % (ref 37–47)
HGB BLD-MCNC: 10.6 G/DL (ref 12–16)
IMM GRANULOCYTES # BLD AUTO: 0.15 K/UL (ref 0–0.11)
IMM GRANULOCYTES NFR BLD AUTO: 0.9 % (ref 0–0.9)
LYMPHOCYTES # BLD AUTO: 0.7 K/UL (ref 1–4.8)
LYMPHOCYTES NFR BLD: 4 % (ref 22–41)
MCH RBC QN AUTO: 29.3 PG (ref 27–33)
MCHC RBC AUTO-ENTMCNC: 32 G/DL (ref 33.6–35)
MCV RBC AUTO: 91.4 FL (ref 81.4–97.8)
MONOCYTES # BLD AUTO: 1.15 K/UL (ref 0–0.85)
MONOCYTES NFR BLD AUTO: 6.6 % (ref 0–13.4)
NEUTROPHILS # BLD AUTO: 15.45 K/UL (ref 2–7.15)
NEUTROPHILS NFR BLD: 88.1 % (ref 44–72)
NRBC # BLD AUTO: 0 K/UL
NRBC BLD-RTO: 0 /100 WBC
PLATELET # BLD AUTO: 106 K/UL (ref 164–446)
PMV BLD AUTO: 12.2 FL (ref 9–12.9)
POTASSIUM SERPL-SCNC: 3.5 MMOL/L (ref 3.6–5.5)
RBC # BLD AUTO: 3.62 M/UL (ref 4.2–5.4)
SODIUM SERPL-SCNC: 141 MMOL/L (ref 135–145)
WBC # BLD AUTO: 17.5 K/UL (ref 4.8–10.8)

## 2018-08-31 PROCEDURE — 36415 COLL VENOUS BLD VENIPUNCTURE: CPT

## 2018-08-31 PROCEDURE — 700111 HCHG RX REV CODE 636 W/ 250 OVERRIDE (IP): Performed by: NURSE PRACTITIONER

## 2018-08-31 PROCEDURE — 770006 HCHG ROOM/CARE - MED/SURG/GYN SEMI*

## 2018-08-31 PROCEDURE — A9270 NON-COVERED ITEM OR SERVICE: HCPCS | Performed by: HOSPITALIST

## 2018-08-31 PROCEDURE — 700105 HCHG RX REV CODE 258: Performed by: HOSPITALIST

## 2018-08-31 PROCEDURE — 80048 BASIC METABOLIC PNL TOTAL CA: CPT

## 2018-08-31 PROCEDURE — 700105 HCHG RX REV CODE 258: Performed by: NURSE PRACTITIONER

## 2018-08-31 PROCEDURE — 700111 HCHG RX REV CODE 636 W/ 250 OVERRIDE (IP): Performed by: HOSPITALIST

## 2018-08-31 PROCEDURE — 700102 HCHG RX REV CODE 250 W/ 637 OVERRIDE(OP): Performed by: HOSPITALIST

## 2018-08-31 PROCEDURE — 99232 SBSQ HOSP IP/OBS MODERATE 35: CPT | Performed by: HOSPITALIST

## 2018-08-31 PROCEDURE — 85025 COMPLETE CBC W/AUTO DIFF WBC: CPT

## 2018-08-31 RX ORDER — POTASSIUM CHLORIDE 20 MEQ/1
40 TABLET, EXTENDED RELEASE ORAL ONCE
Status: COMPLETED | OUTPATIENT
Start: 2018-08-31 | End: 2018-08-31

## 2018-08-31 RX ORDER — AMOXICILLIN 500 MG/1
500 CAPSULE ORAL EVERY 12 HOURS
Status: DISCONTINUED | OUTPATIENT
Start: 2018-09-01 | End: 2018-09-01 | Stop reason: HOSPADM

## 2018-08-31 RX ADMIN — LEVOTHYROXINE SODIUM 75 MCG: 75 TABLET ORAL at 04:50

## 2018-08-31 RX ADMIN — KETOROLAC TROMETHAMINE 15 MG: 30 INJECTION, SOLUTION INTRAMUSCULAR at 07:39

## 2018-08-31 RX ADMIN — PRAVASTATIN SODIUM 80 MG: 20 TABLET ORAL at 20:02

## 2018-08-31 RX ADMIN — KETOROLAC TROMETHAMINE 15 MG: 30 INJECTION, SOLUTION INTRAMUSCULAR at 16:31

## 2018-08-31 RX ADMIN — CEFTRIAXONE SODIUM 1 G: 1 INJECTION, POWDER, FOR SOLUTION INTRAMUSCULAR; INTRAVENOUS at 04:49

## 2018-08-31 RX ADMIN — GABAPENTIN 800 MG: 400 CAPSULE ORAL at 04:50

## 2018-08-31 RX ADMIN — POTASSIUM CHLORIDE 40 MEQ: 1500 TABLET, EXTENDED RELEASE ORAL at 07:39

## 2018-08-31 RX ADMIN — GABAPENTIN 800 MG: 400 CAPSULE ORAL at 17:24

## 2018-08-31 RX ADMIN — BACLOFEN 10 MG: 10 TABLET ORAL at 17:25

## 2018-08-31 RX ADMIN — SODIUM CHLORIDE: 9 INJECTION, SOLUTION INTRAVENOUS at 04:57

## 2018-08-31 RX ADMIN — BACLOFEN 10 MG: 10 TABLET ORAL at 04:56

## 2018-08-31 RX ADMIN — GABAPENTIN 800 MG: 400 CAPSULE ORAL at 11:41

## 2018-08-31 ASSESSMENT — ENCOUNTER SYMPTOMS
EYES NEGATIVE: 1
WEAKNESS: 0
FLANK PAIN: 1
VOMITING: 0
NAUSEA: 0
SHORTNESS OF BREATH: 0
FEVER: 0
CHILLS: 0
FALLS: 0
DIZZINESS: 0
HEADACHES: 0
DEPRESSION: 0
SORE THROAT: 0
MYALGIAS: 0
COUGH: 0
SPUTUM PRODUCTION: 0
CONSTIPATION: 0
ABDOMINAL PAIN: 0

## 2018-08-31 ASSESSMENT — PAIN SCALES - GENERAL: PAINLEVEL_OUTOF10: 7

## 2018-08-31 NOTE — PROGRESS NOTES
"[facility] Hospitalist Progress Note    Date of Service: 8/31/2018    Chief Complaint  73 y.o. female admitted 8/28/2018 with left flank and abdominal pain.   Patient has history of renal calculi several years ago. Was transferred from outlying facility for Urology evaluation and further management.     Interval Problem Update  8/29- Patient states pain to left flank is controlled at this time due to pain medications. Only complaint at this time is that she is hungry.  Pending nephrostomy tube placement this afternoon with Urology/IR. NPO at this time. IV rocephin initiated based on UA results collected here. Will continue to watch closely and anticipate discharge soon with close outpatient follow up with urology. Patient remains afebrile.   8/30- Patient states overall she feels well, little left flank pain that had increased overnight. Otherwise, she is just frustrated with procedure being moved and is hungry again. Rocephin IV to continue- UA culture shows positive for E. Coli, sensitive to Rocephin. Will transition to PO on discharge. Patient has questions for Urology. Hopeful for nephrostomy tube placement this afternoon. Home health to help with dressing changes has been ordered.   8/31- Patient doing significantly better post rapid response yesterday after nephrostomy tube placement. Patient stated \"I told them I was a lightweight and to give me kid doses\". Oxygen saturations still continue to drop slightly so patient has agreed to be monitored overnight prior to discharge. Pain is under control at this time, and she states no further medical complaints. VSS, and patient A&Ox4. Blood cultures preliminary negative, pending final read, lactic acid within normal limits, at 1.2.      Consultants/Specialty  Urology- Dr. De Los Santos    Disposition  Likely home with home health and close outpatient follow up with Urology         Review of Systems   Constitutional: Negative for chills and fever.   HENT: Negative for " congestion and sore throat.    Eyes: Negative.    Respiratory: Negative for cough, sputum production and shortness of breath.    Cardiovascular: Negative for chest pain and leg swelling.   Gastrointestinal: Negative for abdominal pain, constipation, nausea and vomiting.   Genitourinary: Positive for dysuria and flank pain (left side, but controlled with medications ). Negative for urgency.   Musculoskeletal: Negative for falls and myalgias.   Skin: Negative for rash.   Neurological: Negative for dizziness, weakness and headaches.   Psychiatric/Behavioral: Negative for depression.      Physical Exam  Laboratory/Imaging   Hemodynamics  Temp (24hrs), Av.2 °C (99 °F), Min:36.5 °C (97.7 °F), Max:38.7 °C (101.6 °F)   Temperature: 36.8 °C (98.3 °F)  Pulse  Av.7  Min: 67  Max: 143 Heart Rate (Monitored): 89  Blood Pressure : 124/66, NIBP: 138/74      Respiratory      Respiration: 16, Pulse Oximetry: 93 %             Fluids    Intake/Output Summary (Last 24 hours) at 18 1014  Last data filed at 18 0900   Gross per 24 hour   Intake              120 ml   Output              280 ml   Net             -160 ml       Nutrition  Orders Placed This Encounter   Procedures   • Diet Order Regular     Standing Status:   Standing     Number of Occurrences:   1     Order Specific Question:   Diet:     Answer:   Regular [1]     Physical Exam   Constitutional: She is oriented to person, place, and time. She appears well-developed. She is active and cooperative. No distress.   HENT:   Head: Normocephalic.   Eyes: Conjunctivae and lids are normal.   Neck: Neck supple. No tracheal tenderness present.   Cardiovascular: Normal rate, regular rhythm and normal heart sounds.  Exam reveals no gallop.    No murmur heard.  Pulmonary/Chest: Effort normal and breath sounds normal. No accessory muscle usage. No respiratory distress. She has no decreased breath sounds. She has no wheezes.   Abdominal: Soft. Normal appearance. She  exhibits no distension. Bowel sounds are decreased. There is no tenderness.   Left nephrostomy tube in place    Musculoskeletal:   Equal strength    Neurological: She is alert and oriented to person, place, and time. She has normal strength. Gait normal.   Skin: Skin is warm and dry. She is not diaphoretic.   Psychiatric: She has a normal mood and affect. Her speech is normal and behavior is normal. Judgment normal. Cognition and memory are normal.   Patient states no medical complaints and happy she is not hallucinating like last time she was admitted and given narcotics.    Nursing note and vitals reviewed.      Recent Labs      08/28/18   1143  08/30/18 0223 08/31/18 0225   WBC  28.2*  13.1*  17.5*   RBC  4.52  3.80*  3.62*   HEMOGLOBIN  13.2  11.1*  10.6*   HEMATOCRIT  41.8  34.8*  33.1*   MCV  92.5  91.6  91.4   MCH  29.2  29.2  29.3   MCHC  31.6*  31.9*  32.0*   RDW  50.6*  50.6*  50.9*   PLATELETCT  173  127*  106*   MPV  11.6  11.6  12.2     Recent Labs      08/28/18   1143  08/30/18 0223 08/31/18 0225   SODIUM  141  138  141   POTASSIUM  3.8  3.6  3.5*   CHLORIDE  108  109  113*   CO2  24  21  20   GLUCOSE  142*  90  106*   BUN  23*  20  10   CREATININE  1.03  0.83  0.69   CALCIUM  9.1  8.2*  7.9*     Recent Labs      08/29/18   0922   INR  1.20*                  Assessment/Plan     Hydronephrosis with urinary obstruction due to renal calculus- (present on admission)   Assessment & Plan    Dr. Antonio Pillai, urologist, consulted by emergency room M.D.  -IV fluids normal saline 125 ML's per hour  -Pain control with Toradol when necessary and morphine as needed  -Baclofen every 8 hours as needed  PRN antiemetics   Nephrostomy tube placed           Abnormal urinalysis- (present on admission)   Assessment & Plan    Patient states some burning with urination   UA showed blood, leukocyte Estrace positive, positive bacteria but no significant leukocytosis  IV Rocephin initiated   Nephrostomy tube in  place         Acquired hypothyroidism- (present on admission)   Assessment & Plan    Continue with active treatment with levothyroxine        Dyslipidemia- (present on admission)   Assessment & Plan    Continue with pravastatin for ongoing active treatment        Tobacco use- (present on admission)   Assessment & Plan    Smoking cessation discussed the patient has no interest in quitting. She did not wish to continue any further discussion on the matter.  States she smokes 6 cigarettes a day.  Nicotine patch if so required          Quality-Core Measures   Reviewed items::  Labs reviewed and Medications reviewed  Cunningham catheter::  No Cunningham  DVT prophylaxis pharmacological::  Enoxaparin (Lovenox) (held for procedure this am)  Ulcer Prophylaxis::  Not indicated  Antibiotics:  Treating active infection/contamination beyond 24 hours perioperative coverage

## 2018-08-31 NOTE — PROGRESS NOTES
2nd set of Q15 vitals started, pt increasingly lethargic, pt shivering, heart rate in the 150s, temp 99.5, pt non responsive to questions. Pt on 10l of oxygen stating in the 80s.Rapid Response called.     MD paged, orders received for chest x-ray, 500ml bolus, labs to be drawn.

## 2018-08-31 NOTE — CARE PLAN
Problem: Safety  Goal: Will remain free from injury  Outcome: PROGRESSING AS EXPECTED      Problem: Pain Management  Goal: Pain level will decrease to patient's comfort goal  Outcome: PROGRESSING AS EXPECTED  PRN toradol has been given with good effect.

## 2018-09-01 VITALS
RESPIRATION RATE: 16 BRPM | HEART RATE: 96 BPM | WEIGHT: 143.96 LBS | OXYGEN SATURATION: 93 % | DIASTOLIC BLOOD PRESSURE: 76 MMHG | BODY MASS INDEX: 26.49 KG/M2 | TEMPERATURE: 99 F | SYSTOLIC BLOOD PRESSURE: 141 MMHG | HEIGHT: 62 IN

## 2018-09-01 PROBLEM — E87.6 HYPOKALEMIA: Status: RESOLVED | Noted: 2018-08-31 | Resolved: 2018-09-01

## 2018-09-01 LAB
ALBUMIN SERPL BCP-MCNC: 3.1 G/DL (ref 3.2–4.9)
ALBUMIN/GLOB SERPL: 1.2 G/DL
ALP SERPL-CCNC: 77 U/L (ref 30–99)
ALT SERPL-CCNC: 24 U/L (ref 2–50)
ANION GAP SERPL CALC-SCNC: 8 MMOL/L (ref 0–11.9)
AST SERPL-CCNC: 42 U/L (ref 12–45)
BACTERIA UR CULT: ABNORMAL
BACTERIA UR CULT: ABNORMAL
BASOPHILS # BLD AUTO: 0.5 % (ref 0–1.8)
BASOPHILS # BLD: 0.05 K/UL (ref 0–0.12)
BILIRUB SERPL-MCNC: 0.6 MG/DL (ref 0.1–1.5)
BUN SERPL-MCNC: 8 MG/DL (ref 8–22)
CALCIUM SERPL-MCNC: 8.1 MG/DL (ref 8.5–10.5)
CHLORIDE SERPL-SCNC: 112 MMOL/L (ref 96–112)
CO2 SERPL-SCNC: 19 MMOL/L (ref 20–33)
CREAT SERPL-MCNC: 0.66 MG/DL (ref 0.5–1.4)
EOSINOPHIL # BLD AUTO: 0.16 K/UL (ref 0–0.51)
EOSINOPHIL NFR BLD: 1.5 % (ref 0–6.9)
ERYTHROCYTE [DISTWIDTH] IN BLOOD BY AUTOMATED COUNT: 48.6 FL (ref 35.9–50)
GLOBULIN SER CALC-MCNC: 2.5 G/DL (ref 1.9–3.5)
GLUCOSE SERPL-MCNC: 101 MG/DL (ref 65–99)
HCT VFR BLD AUTO: 31.6 % (ref 37–47)
HGB BLD-MCNC: 10.6 G/DL (ref 12–16)
IMM GRANULOCYTES # BLD AUTO: 0.08 K/UL (ref 0–0.11)
IMM GRANULOCYTES NFR BLD AUTO: 0.7 % (ref 0–0.9)
LYMPHOCYTES # BLD AUTO: 0.64 K/UL (ref 1–4.8)
LYMPHOCYTES NFR BLD: 5.9 % (ref 22–41)
MAGNESIUM SERPL-MCNC: 1.7 MG/DL (ref 1.5–2.5)
MCH RBC QN AUTO: 30.2 PG (ref 27–33)
MCHC RBC AUTO-ENTMCNC: 33.5 G/DL (ref 33.6–35)
MCV RBC AUTO: 90 FL (ref 81.4–97.8)
MONOCYTES # BLD AUTO: 0.98 K/UL (ref 0–0.85)
MONOCYTES NFR BLD AUTO: 9 % (ref 0–13.4)
NEUTROPHILS # BLD AUTO: 8.99 K/UL (ref 2–7.15)
NEUTROPHILS NFR BLD: 82.4 % (ref 44–72)
NRBC # BLD AUTO: 0 K/UL
NRBC BLD-RTO: 0 /100 WBC
PLATELET # BLD AUTO: 110 K/UL (ref 164–446)
PMV BLD AUTO: 12.1 FL (ref 9–12.9)
POTASSIUM SERPL-SCNC: 3.7 MMOL/L (ref 3.6–5.5)
PROT SERPL-MCNC: 5.6 G/DL (ref 6–8.2)
RBC # BLD AUTO: 3.51 M/UL (ref 4.2–5.4)
SIGNIFICANT IND 70042: ABNORMAL
SITE SITE: ABNORMAL
SODIUM SERPL-SCNC: 139 MMOL/L (ref 135–145)
SOURCE SOURCE: ABNORMAL
WBC # BLD AUTO: 10.9 K/UL (ref 4.8–10.8)

## 2018-09-01 PROCEDURE — A9270 NON-COVERED ITEM OR SERVICE: HCPCS | Performed by: HOSPITALIST

## 2018-09-01 PROCEDURE — 700102 HCHG RX REV CODE 250 W/ 637 OVERRIDE(OP): Performed by: HOSPITALIST

## 2018-09-01 PROCEDURE — 83735 ASSAY OF MAGNESIUM: CPT

## 2018-09-01 PROCEDURE — 85025 COMPLETE CBC W/AUTO DIFF WBC: CPT

## 2018-09-01 PROCEDURE — 80053 COMPREHEN METABOLIC PANEL: CPT

## 2018-09-01 PROCEDURE — A9270 NON-COVERED ITEM OR SERVICE: HCPCS | Performed by: NURSE PRACTITIONER

## 2018-09-01 PROCEDURE — 700102 HCHG RX REV CODE 250 W/ 637 OVERRIDE(OP): Performed by: NURSE PRACTITIONER

## 2018-09-01 PROCEDURE — 36415 COLL VENOUS BLD VENIPUNCTURE: CPT

## 2018-09-01 PROCEDURE — 99239 HOSP IP/OBS DSCHRG MGMT >30: CPT | Performed by: HOSPITALIST

## 2018-09-01 RX ORDER — KETOROLAC TROMETHAMINE 10 MG/1
10 TABLET, FILM COATED ORAL EVERY 4 HOURS PRN
Qty: 15 TAB | Refills: 0 | Status: SHIPPED | OUTPATIENT
Start: 2018-09-01

## 2018-09-01 RX ORDER — ACETAMINOPHEN 325 MG/1
650 TABLET ORAL EVERY 6 HOURS PRN
Qty: 30 TAB | Refills: 0 | COMMUNITY
Start: 2018-09-01

## 2018-09-01 RX ORDER — AMOXICILLIN 500 MG/1
500 CAPSULE ORAL EVERY 12 HOURS
Qty: 14 CAP | Refills: 0 | Status: SHIPPED | OUTPATIENT
Start: 2018-09-01 | End: 2018-09-08

## 2018-09-01 RX ADMIN — LEVOTHYROXINE SODIUM 75 MCG: 75 TABLET ORAL at 05:10

## 2018-09-01 RX ADMIN — AMOXICILLIN 500 MG: 500 CAPSULE ORAL at 05:10

## 2018-09-01 RX ADMIN — GABAPENTIN 800 MG: 400 CAPSULE ORAL at 05:10

## 2018-09-01 ASSESSMENT — PAIN SCALES - GENERAL: PAINLEVEL_OUTOF10: 0

## 2018-09-01 NOTE — DISCHARGE SUMMARY
Hospital Medicine Discharge Note     Patient ID:  Shanelle Unger  [id number]  73 y.o.female  1945    Admit Date:  8/28/2018       Discharge Date:   9/1/2018    Primary Care Provider: Madhav Rasmussen M.D.    Admitting Physician: Abdiel Tripathi D.O.  Discharging Physician: Reshma Apodaca M.D.    Chief Complaint: Left flank and abdominal pain.     Discharge Diagnoses:   Active Problems:    Hydronephrosis with urinary obstruction due to renal calculus- patient to follow up with urology or nephrostomy tube management and further procedures.     Abnormal urinalysis-Patient to complete antibiotic therapy     Tobacco use- patient was counseled on cessation but has no plans to quit at this time     Dyslipidemia-Continue home medication     Acquired hypothyroidism- Continue home medication     UTI (urinary tract infection)- complete antibiotics    Hypokalemia- resolved with replacement      Chronic Medical Problems:  Past Medical History:   Diagnosis Date   • Anesthesia     post-op nausea slow to wake up   • Unspecified disorder of thyroid        Code Status: DNAR/DNI    Hospital Summary:       Please refer to H&P by Abdiel Tripathi D.O. on 8/28/2018 for full details.      In brief, Shanelle Unger is a 73 y.o. female who was admitted 8/28/2018 for left flank and abdominal pain.  Patient states she was woken up approximately 2 AM from sleep and went straight to the hospital in [facility] due to severe pain in her left lower quadrant and flank pain.  At outlying facility patient was found to have obstructing 2.2×1.6 cm stone in the left renal pelvis causing moderate left hydronephrosis.  UA at outlying facility noted gross hematuria.  Patient had history of kidney stones in the 1980s.  Patient was transferred to [facility] for higher level of care as well as urology consultation.   Patient was admitted to medical floor.  Patient was provided IV fluid hydration at 125 mL's per hour.  Patient was provided  symptom management with IV Toradol and morphine as needed.  Urology was consulted and decided to place left nephrostomy tube with possible need for procedure to remove stone at a later date.  UA was to be collected on transfer which was grossly positive for infection with positive bacteria, leukocyte esterase, and nitrate positive.  Patient was placed on IV empiric antibiotics with Rocephin daily until culture results were finalized.  Urine culture final results are positive for E. coli sensitive to ampicillin therefore patient was transitioned to oral amoxicillin.  Blood cultures remain negative ×2 to date. patient was taken to interventional radiology and with conscious sedation left nephrostomy tube was placed with drainage bag to gravity.  Patient did have slight reaction to anesthesia/conscious sedation and required oxygen therapy as well as fluid bolus.  Patient's vital signs returned to hemodynamically stable state.  Patient states pain is well controlled with IV Toradol as well as Tylenol.  Nephrostomy draining clear to yellow fluid of adequate amounts.  Patient will be discharged home with home health to help with dressing changes and close follow-up with urology for further intervention on renal stone as well as removal of nephrostomy tube.  Patient will continue with p.o. antibiotics and has been transitioned in the hospital.  Patient is up ambulating independently.  Patient's lungs are clear on auscultation and she is maintaining oxygen saturations greater than 90% on room air.  Patient's vital signs are stable at this time.  Patient's pain is well controlled.  Patient states she understands plan of care and will call urology for follow-up appointment.  Patient has been accepted to home health in [place] and they will be assisting with dressing changes as well as assessments.  On discharge leukocytosis has almost returned to normal limits and patient has remained afebrile and states she is ready for  discharge at this time.    Therefore, she is discharged in good and stable condition with close outpatient follow-up.    Consultants:      Urology- Filiberto     Studies:    Imaging/ Testing:      DX-CHEST-PORTABLE (1 VIEW)   Final Result      Development of mild pulmonary edema      IR-PERQ NEPH CATH NEW ACCESS (ALL RADIOLOGY) LEFT   Final Result      1. ULTRASOUND AND FLUOROSCOPIC GUIDED PLACEMENT OF A LEFT 8 [place]  PIGTAIL LOCKING LOOP PERCUTANEOUS NEPHROSTOMY CATHETER.      2. LARGE 23 MM ROUNDED CALCULUS OCCUPYING THE LEFT RENAL PELVIS.      3. NEPHROSTOGRAM SHOWING SATISFACTORY CATHETER POSITION WITHOUT EXTRAVASATION. STONE FILLING DEFECT IN THE RENAL PELVIS. NO LEFT HYDRONEPHROSIS/SIGNIFICANT CALYCEAL DILATATION.      OUTSIDE IMAGES-CT ABDOMEN /PELVIS   Final Result            Laboratory:   Recent Labs      08/30/18 0223 08/31/18 0225 09/01/18   0242   WBC  13.1*  17.5*  10.9*   RBC  3.80*  3.62*  3.51*   HEMOGLOBIN  11.1*  10.6*  10.6*   HEMATOCRIT  34.8*  33.1*  31.6*   MCV  91.6  91.4  90.0   MCH  29.2  29.3  30.2   MCHC  31.9*  32.0*  33.5*   RDW  50.6*  50.9*  48.6   PLATELETCT  127*  106*  110*   MPV  11.6  12.2  12.1       Recent Labs      08/30/18 0223 08/31/18 0225 09/01/18   0242   SODIUM  138  141  139   POTASSIUM  3.6  3.5*  3.7   CHLORIDE  109  113*  112   CO2  21  20  19*   GLUCOSE  90  106*  101*   BUN  20  10  8   CREATININE  0.83  0.69  0.66   CALCIUM  8.2*  7.9*  8.1*       Recent Labs      08/30/18 0223 08/31/18 0225 09/01/18   0242   ALTSGPT   --    --   24   ASTSGOT   --    --   42   ALKPHOSPHAT   --    --   77   TBILIRUBIN   --    --   0.6   GLUCOSE  90  106*  101*        Results     Procedure Component Value Units Date/Time    URINE CULTURE(NEW) [[id number]] Collected:  08/30/18 1017    Order Status:  Completed Specimen:  Urine from Urine, Clean Catch Updated:  08/31/18 1433     Significant Indicator NEG     Source UR     Site URINE, CLEAN CATCH     Urine Culture  "Culture in progress.    Narrative:       Collected By:[id number] MARGI MARIE  please add to urinalysis that was previously collected  Indication for culture:->Costovertebral or Flank Pain  ** please add to urinalysis that was previously collected    BLOOD CULTURE [[id number]] Collected:  08/30/18 2026    Order Status:  Completed Specimen:  Blood from Peripheral Updated:  08/31/18 0907     Significant Indicator NEG     Source BLD     Site PERIPHERAL     Blood Culture No Growth    Note: Blood cultures are incubated for 5 days and  are monitored continuously.Positive blood cultures  are called to the RN and reported as soon as  they are identified.      Narrative:       Per Hospital Policy: Only change Specimen Src: to \"Line\" if  specified by physician order.    BLOOD CULTURE [[id number]] Collected:  08/30/18 2026    Order Status:  Completed Specimen:  Blood from Peripheral Updated:  08/31/18 0907     Significant Indicator NEG     Source BLD     Site PERIPHERAL     Blood Culture No Growth    Note: Blood cultures are incubated for 5 days and  are monitored continuously.Positive blood cultures  are called to the RN and reported as soon as  they are identified.      Narrative:       Per Hospital Policy: Only change Specimen Src: to \"Line\" if  specified by physician order.    URINE CULTURE [[id number]]  (Abnormal)  (Susceptibility) Collected:  08/28/18 1134    Order Status:  Completed Specimen:  Urine Updated:  08/30/18 0855     Significant Indicator POS (POS)     Source UR     Site --     Urine Culture -- (A)      Escherichia coli  >100,000 cfu/mL   (A)    Narrative:       Indication for culture:->Emergency Room Patient    Culture & Susceptibility     ESCHERICHIA COLI     Antibiotic Sensitivity Microscan Unit Status    Ampicillin Sensitive <=8 mcg/mL Final    Method: SENSITIVITY, MARCELA    Cefepime Sensitive <=8 mcg/mL Final    Method: SENSITIVITY, MARCELA    Cefotaxime Sensitive <=2 mcg/mL Final    Method: SENSITIVITY, MARCELA    " Cefotetan Sensitive <=16 mcg/mL Final    Method: SENSITIVITY, MARCELA    Ceftazidime Sensitive <=1 mcg/mL Final    Method: SENSITIVITY, MARCELA    Ceftriaxone Sensitive <=8 mcg/mL Final    Method: SENSITIVITY, MARCELA    Cefuroxime Intermediate 16 mcg/mL Final    Method: SENSITIVITY, MARCELA    Cephalothin Intermediate 16 mcg/mL Final    Method: SENSITIVITY, MARCELA    Ciprofloxacin Resistant >2 mcg/mL Final    Method: SENSITIVITY, MARCELA    Gentamicin Sensitive <=4 mcg/mL Final    Method: SENSITIVITY, MARCELA    Levofloxacin Resistant >4 mcg/mL Final    Method: SENSITIVITY, MARCELA    Nitrofurantoin Sensitive <=32 mcg/mL Final    Method: SENSITIVITY, MARCELA    Pip/Tazobactam Sensitive <=16 mcg/mL Final    Method: SENSITIVITY, MARCELA    Piperacillin Sensitive <=16 mcg/mL Final    Method: SENSITIVITY, MARCELA    Tigecycline Sensitive <=2 mcg/mL Final    Method: SENSITIVITY, MARCELA    Tobramycin Sensitive <=4 mcg/mL Final    Method: SENSITIVITY, MARCELA    Trimeth/Sulfa Sensitive <=2/38 mcg/mL Final    Method: SENSITIVITY, MARCELA                       URINALYSIS [[id number]]  (Abnormal) Collected:  08/28/18 1134    Order Status:  Completed Specimen:  Urine Updated:  08/28/18 1156     Color DK Yellow     Character Turbid (A)     Specific Gravity 1.015     Ph 5.5     Glucose Negative mg/dL      Ketones Negative mg/dL      Protein 100 (A) mg/dL      Bilirubin Negative     Urobilinogen, Urine 1.0     Nitrite Positive (A)     Leukocyte Esterase Large (A)     Occult Blood Large (A)     Micro Urine Req Microscopic    Narrative:       Indication for culture:->Emergency Room Patient          Blood Culture   Date Value Ref Range Status   08/30/2018   Preliminary    No Growth    Note: Blood cultures are incubated for 5 days and  are monitored continuously.Positive blood cultures  are called to the RN and reported as soon as  they are identified.     08/30/2018   Preliminary    No Growth    Note: Blood cultures are incubated for 5 days and  are monitored continuously.Positive  blood cultures  are called to the RN and reported as soon as  they are identified.          Procedures/Surgeries:        Left nephrostomy placement     Disposition:    Discharge home with home health services    Condition:  Stable    Instructions:   Activity: As tolerated.  Diet: As tolerated   Followup: With Urology and PCP   Instructions:  -Please care for nephrostomy tube, and have home health help with dressing changes   -Take all medications as prescribed: antibiotics until completed, and utilize tylenol for pain and toradol when tylenol not effective  -Given instructions to return to the ER if any new or worsening symptoms, worsening condition, or failure to improve  -Call PCP for followup  -No smoking, no alcohol, no caffeine  -Encourage risk factor reduction with tobacco and alcohol abstinence, diet changes, weight loss, and exercise.     Follow-Up  [facility] ([facility])  [address] [place] [id number] [phone]        Madhav Rasmussen M.D.  [address] [place] [id number] [phone]    Go on 11/7/2018  Please arrive at 9:00 am for your appointment with primary care. You are on a cancellation list to be seen sooner. Please follow up with the Urologist at this time.     Antonio De Los Santos M.D.  [address] [place] [id number] [phone]    Call today  call when you return home to make appointment for procedure and nephrosotomy care         Discharge Medications:           Medication List      START taking these medications      Instructions   acetaminophen 325 MG Tabs  Commonly known as:  TYLENOL   Take 2 Tabs by mouth every 6 hours as needed (Mild Pain; (Pain scale 1-3); Temp greater than 100.5 F).  Dose:  650 mg     amoxicillin 500 MG Caps  Commonly known as:  AMOXIL   Take 1 Cap by mouth every 12 hours for 7 days.  Dose:  500 mg     ketorolac 10 MG Tabs  Commonly known as:  TORADOL   Take 1 Tab by mouth every four hours as needed.  Dose:  10 mg         CONTINUE taking these medications      Instructions   B COMPLEX 1 PO   Take 1 Tab by mouth every day.  Dose:  1 Tab     baclofen 10 MG Tabs  Commonly known as:  LIORESAL   Take 10-20 mg by mouth 2 Times a Day. Pt takes: 10MG in AM 20MG HS  Dose:  10-20 mg     CALCIUM 600 600 MG Tabs  Generic drug:  Calcium Carbonate   Take 1 Tab by mouth 2 Times a Day.  Dose:  1 Tab     gabapentin 800 MG tablet  Commonly known as:  NEURONTIN   Take 800 mg by mouth 3 times a day.  Dose:  800 mg     ibuprofen 200 MG Tabs  Commonly known as:  MOTRIN   Take 200 mg by mouth every 6 hours as needed for Mild Pain.  Dose:  200 mg     levothyroxine 75 MCG Tabs  Commonly known as:  SYNTHROID   Take 75 mcg by mouth every day.  Dose:  75 mcg     JESS-COLACE 8.6-50 MG Tabs  Generic drug:  senna-docusate   Take 2 Tabs by mouth as needed for Constipation.  Dose:  2 Tab     PRAVACHOL 80 MG tablet  Generic drug:  pravastatin   Take 80 mg by mouth every evening.  Dose:  80 mg            Total time of the discharge process exceeds 40 minutes. This included face to face with the patient, medication reconciliation, care co ordination with nursing  involved in patient care and discussion and co ordination with case management.     Please CC the above physicians    LASHONDA AdamesPOCTAVIANO  9/1/2018  8:55 AM

## 2018-09-01 NOTE — PROGRESS NOTES
Uneventful night. Denied pain/discomfort during shift. Pt asked for her dressing to be reinforce at the beginning of shift. O2 sat remains above 90% at room air the whole night. Pt ambulated well with stand by assist. Call light is within reach and in working condition. Will continue to monitor

## 2018-09-01 NOTE — PROGRESS NOTES
Pt was given discharge paperwork. She verbalized understanding of prescriptions and instructions. IV was removed. Pt sats have been above 90% during shift. Nephrostomy site is C/D/I. VSS.  Pt waiting for ride.

## 2018-09-01 NOTE — DISCHARGE INSTRUCTIONS
Discharge Instructions    Discharged to home by car with relative. Discharged via wheelchair, hospital escort: Yes.  Special equipment needed: Not Applicable    Be sure to schedule a follow-up appointment with your primary care doctor or any specialists as instructed.     Discharge Plan:   Diet Plan: Discussed  Activity Level: Discussed  Confirmed Follow up Appointment: Patient to Call and Schedule Appointment  Confirmed Symptoms Management: Discussed  Medication Reconciliation Updated: Yes  Influenza Vaccine Indication: Patient Refuses    I understand that a diet low in cholesterol, fat, and sodium is recommended for good health. Unless I have been given specific instructions below for another diet, I accept this instruction as my diet prescription.   Other diet: Regular    Activity: As tolerated.   Diet: As tolerated   Followup: With Urology and PCP   Instructions:   -Please care for nephrostomy tube, and have home health help with dressing changes   -Take all medications as prescribed: antibiotics until completed, and utilize tylenol for pain and toradol when tylenol not effective   -Given instructions to return to the ER if any new or worsening symptoms, worsening condition, or failure to improve   -Call PCP for followup   -No smoking, no alcohol, no caffeine   -Encourage risk factor reduction with tobacco and alcohol abstinence, diet changes, weight loss, and exercise.      Special Instructions: None    · Is patient discharged on Warfarin / Coumadin?   No     Depression / Suicide Risk    As you are discharged from this [facility] facility, it is important to learn how to keep safe from harming yourself.    Recognize the warning signs:  · Abrupt changes in personality, positive or negative- including increase in energy   · Giving away possessions  · Change in eating patterns- significant weight changes-  positive or negative  · Change in sleeping patterns- unable to sleep or sleeping all the  time   · Unwillingness or inability to communicate  · Depression  · Unusual sadness, discouragement and loneliness  · Talk of wanting to die  · Neglect of personal appearance   · Rebelliousness- reckless behavior  · Withdrawal from people/activities they love  · Confusion- inability to concentrate     If you or a loved one observes any of these behaviors or has concerns about self-harm, here's what you can do:  · Talk about it- your feelings and reasons for harming yourself  · Remove any means that you might use to hurt yourself (examples: pills, rope, extension cords, firearm)  · Get professional help from the community (Mental Health, Substance Abuse, psychological counseling)  · Do not be alone:Call your Safe Contact- someone whom you trust who will be there for you.  · Call your local CRISIS HOTLINE 650-4467 or 712-725-8191  · Call your local Children's Mobile Crisis Response Team [place] (275) 938-4823 or www.Clearwire  · Call the toll free National Suicide Prevention Hotlines   · National Suicide Prevention Lifeline 130-019-XFKQ (8841)  · National Hope Line Network 800-SUICIDE (205-9386)    Nephrostomy   Care After  Refer to this sheet in the next few weeks. These instructions provide you with information on caring for yourself after your procedure. Your caregiver may also give you specific instructions. Your treatment has been planned according to current medical practices, but problems sometimes occur. Call your caregiver if you have any problems or questions after your procedure.  HOME CARE INSTRUCTIONS  · Avoid taking aspirin or non-steroidal anti-inflammatory drugs (NSAIDs).  · Only take over-the-counter or prescription medicines for pain, discomfort, or fever as directed by your caregiver.  · Take your antibiotics as directed. Finish them even if you start to feel better.  · Rest for the remainder of the day. Do not operate machinery, drive, or make legal decisions for 24 hours after your  procedure.  · Have someone drive you home.  · You may resume your usual diet after the procedure. Drink extra fluids while the catheter is in place. Avoid alcoholic beverages for 24 hours after the procedure.  · Keep the skin around your catheter dry.  · You may shower. Cover the area with plastic wrap and tape the edges of the plastic wrap to your skin so your skin remains dry under the plastic. If the area does get wet, dry the skin completely.  · Avoid baths or swimming.  SEEK MEDICAL CARE IF:   · Redness, increased soreness, or swelling develops.  · Your symptoms persist after several days or worsen.  · If you seeblood in the urine (hematuria) for over 48 hours.  SEEK IMMEDIATE MEDICAL CARE IF:   · Your flexible tube (catheter) accidentally gets pulled out.  · You have chills or increased pain.  · You have an oral temperature above 102° F (38.9° C), not controlled by medicine.  · The skin breaks down around the catheter.  · Your catheter stops draining.The catheter may be blocked.  · There is leakage of urine around catheter.  Document Released: 08/10/2005 Document Revised: 03/11/2013 Document Reviewed: 02/16/2010  [employer]® Patient Information ©2014 [employer].      Kidney Stones  Kidney stones (urolithiasis) are solid, rock-like deposits that form inside of the organs that make urine (kidneys). A kidney stone may form in a kidney and move into the bladder, where it can cause intense pain and block the flow of urine. Kidney stones are created when high levels of certain minerals are found in the urine. They are usually passed through urination, but in some cases, medical treatment may be needed to remove them.  What are the causes?  Kidney stones may be caused by:  · A condition in which certain glands produce too much parathyroid hormone (primary hyperparathyroidism), which causes too much calcium buildup in the blood.  · Buildup of uric acid crystals in the bladder (hyperuricosuria). Uric acid is a  chemical that the body produces when you eat certain foods. It usually exits the body in the urine.  · Narrowing (stricture) of one or both of the tubes that drain urine from the kidneys to the bladder (ureters).  · A kidney blockage that is present at birth (congenital obstruction).  · Past surgery on the kidney or the ureters, such as gastric bypass surgery.  What increases the risk?  The following factors make you more likely to develop kidney stones:  · Having had a kidney stone in the past.  · Having a family history of kidney stones.  · Not drinking enough water.  · Eating a diet that is high in protein, salt (sodium), or sugar.  · Being overweight or obese.  What are the signs or symptoms?  Symptoms of a kidney stone may include:  · Nausea.  · Vomiting.  · Blood in the urine (hematuria).  · Pain in the side of the abdomen, right below the ribs (flank pain). Pain usually spreads (radiates) to the groin.  · Needing to urinate frequently or urgently.  How is this diagnosed?  This condition may be diagnosed based on:  · Your medical history.  · A physical exam.  · Blood tests.  · Urine tests.  · CT scan.  · Abdominal X-ray.  · A procedure to examine the inside of the bladder (cystoscopy).  How is this treated?  Treatment for kidney stones depends on the size, location, and makeup of the stones. Treatment may involve:  · Analyzing your urine before and after you pass the stone through urination.  · Being monitored at the hospital until you pass the stone through urination.  · Increasing your fluid intake and decreasing the amount of calcium and protein in your diet.  · A procedure to break up kidney stones in the bladder using:  ¨ A focused beam of light (laser therapy).  ¨ Shock waves (extracorporeal shock wave lithotripsy).  · Surgery to remove kidney stones. This may be needed if you have severe pain or have stones that block your urinary tract.  Follow these instructions at home:  Eating and drinking  · Drink  enough fluid to keep your urine clear or pale yellow. This will help you to pass the kidney stone.  · If directed, change your diet. This may include:  ¨ Limiting how much sodium you eat.  ¨ Eating more fruits and vegetables.  ¨ Limiting how much meat, poultry, fish, and eggs you eat.  · Follow instructions from your health care provider about eating or drinking restrictions.  General instructions  · Collect urine samples as told by your health care provider. You may need to collect a urine sample:  ¨ 24 hours after you pass the stone.  ¨ 8-12 weeks after passing the kidney stone, and every 6-12 months after that.  · Strain your urine every time you urinate, for as long as directed. Use the strainer that your health care provider recommends.  · Do not throw out the kidney stone after passing it. Keep the stone so it can be tested by your health care provider. Testing the makeup of your kidney stone may help prevent you from getting kidney stones in the future.  · Take over-the-counter and prescription medicines only as told by your health care provider.  · Keep all follow-up visits as told by your health care provider. This is important. You may need follow-up X-rays or ultrasounds to make sure that your stone has passed.  How is this prevented?  To prevent another kidney stone:  · Drink enough fluid to keep your urine clear or pale yellow. This is the best way to prevent kidney stones.  · Eat a healthy diet and follow recommendations from your health care provider about foods to avoid. You may be instructed to eat a low-protein diet. Recommendations vary depending on the type of kidney stone that you have.  · Maintain a healthy weight.  Contact a health care provider if:  · You have pain that gets worse or does not get better with medicine.  Get help right away if:  · You have a fever or chills.  · You develop severe pain.  · You develop new abdominal pain.  · You faint.  · You are unable to urinate.  This  information is not intended to replace advice given to you by your health care provider. Make sure you discuss any questions you have with your health care provider.  Document Released: 12/18/2006 Document Revised: 07/07/2017 Document Reviewed: 06/02/2017  [employer] Interactive Patient Education © 2017 [employer]

## 2018-09-01 NOTE — CARE PLAN
Problem: Communication  Goal: The ability to communicate needs accurately and effectively will improve  Outcome: PROGRESSING AS EXPECTED  Educate the pt on the use of call light to call for assistance    Problem: Safety  Goal: Will remain free from falls  Outcome: PROGRESSING AS EXPECTED  Nonskid footwear will be provided for the pt to prevent falls

## 2018-09-04 ENCOUNTER — TELEPHONE (OUTPATIENT)
Dept: HOSPITALIST | Facility: MEDICAL CENTER | Age: 73
End: 2018-09-04

## 2018-09-04 LAB
BACTERIA BLD CULT: NORMAL
SIGNIFICANT IND 70042: NORMAL
SITE SITE: NORMAL
SOURCE SOURCE: NORMAL

## 2018-09-05 NOTE — TELEPHONE ENCOUNTER
I received a call from the lab indicating the patient had 1 out of 2 blood cultures which were positive, this was a late result result, speciation is pending.  I spoke with the patient by phone and we discussed the results, she is feeling fine and is afebrile, she is taking amoxicillin and tolerating without issues.  I will have to follow-up on final speciation and will call her with the results, in the meantime she will seek medical attention if she starts to feel generally worse or if she develops a fever

## 2018-09-06 ENCOUNTER — HOSPITAL ENCOUNTER (OUTPATIENT)
Facility: MEDICAL CENTER | Age: 73
End: 2018-09-06
Attending: UROLOGY | Admitting: UROLOGY
Payer: MEDICARE

## 2018-09-06 LAB
BACTERIA BLD CULT: ABNORMAL
BACTERIA BLD CULT: ABNORMAL
SIGNIFICANT IND 70042: ABNORMAL
SITE SITE: ABNORMAL
SOURCE SOURCE: ABNORMAL

## 2018-09-07 ENCOUNTER — TELEPHONE (OUTPATIENT)
Dept: HOSPITALIST | Facility: MEDICAL CENTER | Age: 73
End: 2018-09-07

## 2018-09-07 NOTE — TELEPHONE ENCOUNTER
Unable to contact patient at listed numbers but final culture results are available  The patient has enterococcus faecalis in both urine and 1/2 blood cultures  Have spoke with ID, parenteral antibiotics are recommended    Patient should go to local ER for treatment, my recommendation is that she get repeat blood cultures on arrival and be treated with a minimum of 7 days of IV ampicillin.    Hospitlist administrative staff is working to contact the patient and forward recommendations.    Blood culture:    Peripheral        Collected: 8/30/2018  8:26 PM    Resulting Agency: [facility]      Culture & Susceptibility     ENTEROCOCCUS FAECALIS     Antibiotic Sensitivity Microscan Unit Status   Ampicillin Sensitive <=2 mcg/mL Final   Daptomycin Sensitive 1 mcg/mL Final   Gent Synergy Sensitive <=500 mcg/mL Final   Penicillin Sensitive 8 mcg/mL Final   Vancomycin            Urine culture:    Urine, Clean Catch        Collected: 8/30/2018 10:17 AM    Resulting Agency: [facility]      Culture & Susceptibility     ENTEROCOCCUS FAECALIS     Antibiotic Sensitivity Microscan Unit Status   Ampicillin Sensitive <=2 mcg/mL Final   Daptomycin Sensitive 1 mcg/mL Final   Nitrofurantoin Sensitive <=32 mcg/mL Final   Penicillin Sensitive 8 mcg/mL Final   Tetracycline

## 2018-09-07 NOTE — TELEPHONE ENCOUNTER
Called and spoke with Shanelle Unger, Patient.  Advised of final blood and urine results as well as Dr. Almodovar and ID's recommendation.  Shanelle has agreed to return to the ED in [place] for recommended therapy.  Received permission from Shanelle to fax documentation for the receiving hospitalist in [place].  She is concerned she has surgery planned in [place] on Monday, but will follow advice.  I called Kurt at [facility] in the ED in [place].  Advised of plan.  Faxed Shanelle's face sheet, Dr. Child note, the lab results and discharge summary from 8/28/2018.  Received fax confirmation.  Kurt will advise hospitalist in [place].  Dr. Almodovar advised pt is aware and will follow plan.    Denniese Joslyn, RN

## 2018-09-21 ENCOUNTER — APPOINTMENT (OUTPATIENT)
Dept: ADMISSIONS | Facility: MEDICAL CENTER | Age: 73
End: 2018-09-21
Attending: UROLOGY
Payer: MEDICARE

## 2018-10-04 ENCOUNTER — APPOINTMENT (OUTPATIENT)
Dept: RADIOLOGY | Facility: MEDICAL CENTER | Age: 73
End: 2018-10-04
Attending: UROLOGY
Payer: MEDICARE

## 2018-10-04 ENCOUNTER — HOSPITAL ENCOUNTER (OUTPATIENT)
Facility: MEDICAL CENTER | Age: 73
End: 2018-10-05
Attending: UROLOGY | Admitting: UROLOGY
Payer: MEDICARE

## 2018-10-04 PROCEDURE — 500042 HCHG BAG, URINARY DRAINAGE (CLOSED): Performed by: UROLOGY

## 2018-10-04 PROCEDURE — C1729 CATH, DRAINAGE: HCPCS | Performed by: UROLOGY

## 2018-10-04 PROCEDURE — 502240 HCHG MISC OR SUPPLY RC 0272: Performed by: UROLOGY

## 2018-10-04 PROCEDURE — 501838 HCHG SUTURE GENERAL: Performed by: UROLOGY

## 2018-10-04 PROCEDURE — 160048 HCHG OR STATISTICAL LEVEL 1-5: Performed by: UROLOGY

## 2018-10-04 PROCEDURE — 501161 HCHG PROBE, SWISS LITHOCLAST: Performed by: UROLOGY

## 2018-10-04 PROCEDURE — C1769 GUIDE WIRE: HCPCS | Performed by: UROLOGY

## 2018-10-04 PROCEDURE — 700102 HCHG RX REV CODE 250 W/ 637 OVERRIDE(OP): Performed by: UROLOGY

## 2018-10-04 PROCEDURE — A9270 NON-COVERED ITEM OR SERVICE: HCPCS | Performed by: UROLOGY

## 2018-10-04 PROCEDURE — 700111 HCHG RX REV CODE 636 W/ 250 OVERRIDE (IP)

## 2018-10-04 PROCEDURE — 160041 HCHG SURGERY MINUTES - EA ADDL 1 MIN LEVEL 4: Performed by: UROLOGY

## 2018-10-04 PROCEDURE — 700101 HCHG RX REV CODE 250

## 2018-10-04 PROCEDURE — 82365 CALCULUS SPECTROSCOPY: CPT

## 2018-10-04 PROCEDURE — C2617 STENT, NON-COR, TEM W/O DEL: HCPCS | Performed by: UROLOGY

## 2018-10-04 PROCEDURE — 160009 HCHG ANES TIME/MIN: Performed by: UROLOGY

## 2018-10-04 PROCEDURE — A4338 INDWELLING CATHETER LATEX: HCPCS | Performed by: UROLOGY

## 2018-10-04 PROCEDURE — 160002 HCHG RECOVERY MINUTES (STAT): Performed by: UROLOGY

## 2018-10-04 PROCEDURE — 501159 HCHG PROBE, LAP: Performed by: UROLOGY

## 2018-10-04 PROCEDURE — G0378 HOSPITAL OBSERVATION PER HR: HCPCS

## 2018-10-04 PROCEDURE — A6402 STERILE GAUZE <= 16 SQ IN: HCPCS | Performed by: UROLOGY

## 2018-10-04 PROCEDURE — 700101 HCHG RX REV CODE 250: Performed by: UROLOGY

## 2018-10-04 PROCEDURE — 501329 HCHG SET, CYSTO IRRIG Y TUR: Performed by: UROLOGY

## 2018-10-04 PROCEDURE — 160029 HCHG SURGERY MINUTES - 1ST 30 MINS LEVEL 4: Performed by: UROLOGY

## 2018-10-04 PROCEDURE — 160036 HCHG PACU - EA ADDL 30 MINS PHASE I: Performed by: UROLOGY

## 2018-10-04 PROCEDURE — 88300 SURGICAL PATH GROSS: CPT

## 2018-10-04 PROCEDURE — 160035 HCHG PACU - 1ST 60 MINS PHASE I: Performed by: UROLOGY

## 2018-10-04 PROCEDURE — 500189 HCHG CATH, COUNCIL TIP 20FR: Performed by: UROLOGY

## 2018-10-04 PROCEDURE — 500423 HCHG DRESSING, ABD COMBINE: Performed by: UROLOGY

## 2018-10-04 DEVICE — STENT UROLOGICAL POLARIS 6X24  ULTRA: Type: IMPLANTABLE DEVICE | Site: URETER | Status: FUNCTIONAL

## 2018-10-04 RX ORDER — DIPHENHYDRAMINE HYDROCHLORIDE 50 MG/ML
25 INJECTION INTRAMUSCULAR; INTRAVENOUS EVERY 6 HOURS PRN
Status: DISCONTINUED | OUTPATIENT
Start: 2018-10-04 | End: 2018-10-05 | Stop reason: HOSPADM

## 2018-10-04 RX ORDER — DEXTROSE MONOHYDRATE, SODIUM CHLORIDE, AND POTASSIUM CHLORIDE 50; 1.49; 4.5 G/1000ML; G/1000ML; G/1000ML
INJECTION, SOLUTION INTRAVENOUS EVERY 6 HOURS
Status: COMPLETED | OUTPATIENT
Start: 2018-10-04 | End: 2018-10-04

## 2018-10-04 RX ORDER — SODIUM CHLORIDE, SODIUM LACTATE, POTASSIUM CHLORIDE, CALCIUM CHLORIDE 600; 310; 30; 20 MG/100ML; MG/100ML; MG/100ML; MG/100ML
INJECTION, SOLUTION INTRAVENOUS CONTINUOUS
Status: ACTIVE | OUTPATIENT
Start: 2018-10-04 | End: 2018-10-04

## 2018-10-04 RX ORDER — DEXAMETHASONE SODIUM PHOSPHATE 4 MG/ML
4 INJECTION, SOLUTION INTRA-ARTICULAR; INTRALESIONAL; INTRAMUSCULAR; INTRAVENOUS; SOFT TISSUE
Status: DISCONTINUED | OUTPATIENT
Start: 2018-10-04 | End: 2018-10-05 | Stop reason: HOSPADM

## 2018-10-04 RX ORDER — LEVOTHYROXINE SODIUM 0.05 MG/1
75 TABLET ORAL DAILY
Status: DISCONTINUED | OUTPATIENT
Start: 2018-10-05 | End: 2018-10-05 | Stop reason: HOSPADM

## 2018-10-04 RX ORDER — SCOLOPAMINE TRANSDERMAL SYSTEM 1 MG/1
1 PATCH, EXTENDED RELEASE TRANSDERMAL
Status: DISCONTINUED | OUTPATIENT
Start: 2018-10-04 | End: 2018-10-05 | Stop reason: HOSPADM

## 2018-10-04 RX ORDER — BACLOFEN 10 MG/1
10 TABLET ORAL 2 TIMES DAILY
Status: DISCONTINUED | OUTPATIENT
Start: 2018-10-04 | End: 2018-10-05 | Stop reason: HOSPADM

## 2018-10-04 RX ORDER — ONDANSETRON 2 MG/ML
4 INJECTION INTRAMUSCULAR; INTRAVENOUS EVERY 4 HOURS PRN
Status: DISCONTINUED | OUTPATIENT
Start: 2018-10-04 | End: 2018-10-05 | Stop reason: HOSPADM

## 2018-10-04 RX ORDER — ACETAMINOPHEN 325 MG/1
650 TABLET ORAL EVERY 6 HOURS PRN
Status: DISCONTINUED | OUTPATIENT
Start: 2018-10-04 | End: 2018-10-05 | Stop reason: HOSPADM

## 2018-10-04 RX ORDER — TRAMADOL HYDROCHLORIDE 50 MG/1
50 TABLET ORAL EVERY 4 HOURS PRN
Status: DISCONTINUED | OUTPATIENT
Start: 2018-10-04 | End: 2018-10-05 | Stop reason: HOSPADM

## 2018-10-04 RX ORDER — HALOPERIDOL 5 MG/ML
1 INJECTION INTRAMUSCULAR
Status: DISCONTINUED | OUTPATIENT
Start: 2018-10-04 | End: 2018-10-04 | Stop reason: HOSPADM

## 2018-10-04 RX ORDER — HALOPERIDOL 5 MG/ML
1 INJECTION INTRAMUSCULAR EVERY 6 HOURS PRN
Status: DISCONTINUED | OUTPATIENT
Start: 2018-10-04 | End: 2018-10-05 | Stop reason: HOSPADM

## 2018-10-04 RX ORDER — GABAPENTIN 400 MG/1
400 CAPSULE ORAL DAILY
Status: DISCONTINUED | OUTPATIENT
Start: 2018-10-05 | End: 2018-10-05 | Stop reason: HOSPADM

## 2018-10-04 RX ORDER — ONDANSETRON 2 MG/ML
4 INJECTION INTRAMUSCULAR; INTRAVENOUS
Status: DISCONTINUED | OUTPATIENT
Start: 2018-10-04 | End: 2018-10-04 | Stop reason: HOSPADM

## 2018-10-04 RX ORDER — PRAVASTATIN SODIUM 20 MG
80 TABLET ORAL EVERY EVENING
Status: DISCONTINUED | OUTPATIENT
Start: 2018-10-04 | End: 2018-10-05 | Stop reason: HOSPADM

## 2018-10-04 RX ADMIN — BACLOFEN 10 MG: 10 TABLET ORAL at 17:34

## 2018-10-04 RX ADMIN — POTASSIUM CHLORIDE, DEXTROSE MONOHYDRATE AND SODIUM CHLORIDE: 150; 5; 450 INJECTION, SOLUTION INTRAVENOUS at 15:13

## 2018-10-04 RX ADMIN — TRAMADOL HYDROCHLORIDE 50 MG: 50 TABLET, FILM COATED ORAL at 16:24

## 2018-10-04 RX ADMIN — PRAVASTATIN SODIUM 80 MG: 20 TABLET ORAL at 17:34

## 2018-10-04 RX ADMIN — TRAMADOL HYDROCHLORIDE 50 MG: 50 TABLET, FILM COATED ORAL at 12:25

## 2018-10-04 RX ADMIN — SODIUM CHLORIDE, SODIUM LACTATE, POTASSIUM CHLORIDE, CALCIUM CHLORIDE: 600; 310; 30; 20 INJECTION, SOLUTION INTRAVENOUS at 09:09

## 2018-10-04 ASSESSMENT — PAIN SCALES - GENERAL
PAINLEVEL_OUTOF10: 2
PAINLEVEL_OUTOF10: 3
PAINLEVEL_OUTOF10: 5
PAINLEVEL_OUTOF10: 7
PAINLEVEL_OUTOF10: 3
PAINLEVEL_OUTOF10: 5
PAINLEVEL_OUTOF10: 3

## 2018-10-04 ASSESSMENT — COGNITIVE AND FUNCTIONAL STATUS - GENERAL
TOILETING: A LITTLE
CLIMB 3 TO 5 STEPS WITH RAILING: A LITTLE
SUGGESTED CMS G CODE MODIFIER MOBILITY: CK
MOVING FROM LYING ON BACK TO SITTING ON SIDE OF FLAT BED: A LITTLE
SUGGESTED CMS G CODE MODIFIER DAILY ACTIVITY: CJ
STANDING UP FROM CHAIR USING ARMS: A LITTLE
DRESSING REGULAR LOWER BODY CLOTHING: A LITTLE
WALKING IN HOSPITAL ROOM: A LITTLE
MOBILITY SCORE: 18
TURNING FROM BACK TO SIDE WHILE IN FLAT BAD: A LITTLE
MOVING TO AND FROM BED TO CHAIR: A LITTLE
HELP NEEDED FOR BATHING: A LITTLE
DRESSING REGULAR UPPER BODY CLOTHING: A LITTLE
DAILY ACTIVITIY SCORE: 20

## 2018-10-04 ASSESSMENT — PATIENT HEALTH QUESTIONNAIRE - PHQ9
1. LITTLE INTEREST OR PLEASURE IN DOING THINGS: NOT AT ALL
1. LITTLE INTEREST OR PLEASURE IN DOING THINGS: NOT AT ALL
SUM OF ALL RESPONSES TO PHQ9 QUESTIONS 1 AND 2: 0
2. FEELING DOWN, DEPRESSED, IRRITABLE, OR HOPELESS: NOT AT ALL
SUM OF ALL RESPONSES TO PHQ9 QUESTIONS 1 AND 2: 0

## 2018-10-04 NOTE — OR SURGEON
Immediate Post OP Note    PreOp Diagnosis: L renal stone    PostOp Diagnosis: same    Procedure(s):  Left PERCUTANEOUS NEPHROSTOLITHOTOMY    Surgeon(s):  Toro Stewart M.D.    Anesthesiologist/Type of Anesthesia:  Anesthesiologist: Jonathan Alston M.D./* No anesthesia type entered *    Surgical Staff:  Circulator: Beto Boss R.N.  Scrub Person: Farhan Zayas  Radiology Technologist: Wale LOPEZ Gross    Specimens removed if any:  L renal stone    Estimated Blood Loss: 50ml    Findings: 2.2cm L renal pelvis stone    Complications: none    Drains:  6X24cm L ureteral stent; 20Fr L neph tube; 16Fr menjivar    Pt will be admitted overnight due to hematuria.    10/4/2018 11:43 AM Toro Stewart M.D.

## 2018-10-04 NOTE — PROGRESS NOTES
"Report received from PACU.  Assessment complete.  A&O x 4. Patient calls appropriately.   Patient denies pain at this time.  Denies N&V. Tolerating regular diet.  Surgical incision to L flank, dressing and nephrostomy tube in place.  + void via menjivar  Patient denies SOB.  SCD's in place.  Patient friend \"Katelin\" at bedside.  Review plan with of care with patient. Call light and personal belongings with in reach. Hourly rounding in place. All needs met at this time.  "

## 2018-10-04 NOTE — OR NURSING
Pt AA/Ox4. VSS. Dressing to left back area, CDI. CMS+. Pt moves all extremities. Pt reports 3/10 pain scale. Oral pain medication given. Pt denies numbness or tingling. No nausea or vomiting. Intact left nephrostomy tube with bloody drainage in collection bag, Dr. Stewart aware. Intact menjivar catheter with cory colored urine. SCDs in place. Report given to CLAUDETTE Gardiner. Pt's friend, Katelin, updated. Awaiting transport.

## 2018-10-04 NOTE — OP REPORT
DATE OF SERVICE:  10/04/2018    PREOPERATIVE DIAGNOSIS:  Left renal stone.    POSTOPERATIVE DIAGNOSIS:  Left renal stone.    PROCEDURE PERFORMED:  Left percutaneous nephrolithotomy.    SURGEON:  Toro Stewart MD    ANESTHESIOLOGIST:  Jonathan Alston MD    ANESTHESIA:  General.    INDICATIONS FOR PROCEDURE:  The patient has history of kidney stones.  Recent   imaging showing a 2.2 cm left renal pelvis stone.  She had a nephrostomy tube   placed already as an outpatient and now presents for definitive treatment with   percutaneous nephrolithotomy.    DESCRIPTION OF PROCEDURE:  After obtaining informed consent, the patient was   brought to the operating room.  After the induction of general anesthesia,   Cunningham catheter was placed and she was then positioned in prone position on the   operating table.  The left flank was then prepped and draped around the   nephrostomy tube.  A sensor wire was able to be passed down the ureter under   fluoroscopic guidance through the nephrostomy tube after which a dual-lumen   ureteral catheter was used to pass an Amplatz Super Stiff wire down the ureter   as well.  The NephroMax balloon dilator was used to dilate the tract into the   renal collecting system and the nephroscope sheath was passed over the   balloon.  A nephroscope was passed into the renal collecting system and a   large stone was seen.  I was able to use pneumatic and ultrasonic lithotripsy   as well as grasper to break up and extract the stone fragments.  Then, there   was clear renal pelvis and no other stone fragments seen.  A 6-Kazakh x 24 cm   ureteral stent was passed down the wire until seen to coil in the bladder and   then a 20-Kazakh Yuhaaviatam tip Cunningham catheter was placed as a nephrostomy tube   with 3 mL sterile water placed in the balloon.  The nephroscope sheath and   extra wire were removed and the nephrostomy tube was sutured in place using 0   silk.  The wound was then cleaned and dried.  The  nephrostomy tube hooked up   to down drain.  Patient was then awoken from anesthesia and taken to recovery   room in stable condition.    COMPLICATIONS:  None.    ESTIMATED BLOOD LOSS:  50 mL.    SPECIMENS:  Left renal stones.    DRAINS:  A 16-Surinamese Cunningham, 6-Surinamese x 24 cm left ureteral stent and a   20-Surinamese left nephrostomy tube.       ____________________________________     MD ALINA Arreguin / ANJALI    DD:  10/04/2018 11:54:37  DT:  10/04/2018 12:08:45    D#:  [id number]  Job#:  [id number]

## 2018-10-05 VITALS
SYSTOLIC BLOOD PRESSURE: 99 MMHG | HEART RATE: 75 BPM | TEMPERATURE: 97.7 F | RESPIRATION RATE: 18 BRPM | DIASTOLIC BLOOD PRESSURE: 58 MMHG | BODY MASS INDEX: 21.34 KG/M2 | WEIGHT: 115.96 LBS | HEIGHT: 62 IN | OXYGEN SATURATION: 99 %

## 2018-10-05 LAB
ANION GAP SERPL CALC-SCNC: 9 MMOL/L (ref 0–11.9)
BUN SERPL-MCNC: 19 MG/DL (ref 8–22)
CALCIUM SERPL-MCNC: 9.3 MG/DL (ref 8.5–10.5)
CHLORIDE SERPL-SCNC: 100 MMOL/L (ref 96–112)
CO2 SERPL-SCNC: 25 MMOL/L (ref 20–33)
CREAT SERPL-MCNC: 1.22 MG/DL (ref 0.5–1.4)
ERYTHROCYTE [DISTWIDTH] IN BLOOD BY AUTOMATED COUNT: 47.3 FL (ref 35.9–50)
GLUCOSE SERPL-MCNC: 152 MG/DL (ref 65–99)
HCT VFR BLD AUTO: 35 % (ref 37–47)
HGB BLD-MCNC: 11.8 G/DL (ref 12–16)
MCH RBC QN AUTO: 30.1 PG (ref 27–33)
MCHC RBC AUTO-ENTMCNC: 33.7 G/DL (ref 33.6–35)
MCV RBC AUTO: 89.3 FL (ref 81.4–97.8)
PLATELET # BLD AUTO: 154 K/UL (ref 164–446)
PMV BLD AUTO: 11.8 FL (ref 9–12.9)
POTASSIUM SERPL-SCNC: 3.8 MMOL/L (ref 3.6–5.5)
RBC # BLD AUTO: 3.92 M/UL (ref 4.2–5.4)
SODIUM SERPL-SCNC: 134 MMOL/L (ref 135–145)
WBC # BLD AUTO: 14.2 K/UL (ref 4.8–10.8)

## 2018-10-05 PROCEDURE — A9270 NON-COVERED ITEM OR SERVICE: HCPCS | Performed by: UROLOGY

## 2018-10-05 PROCEDURE — 36415 COLL VENOUS BLD VENIPUNCTURE: CPT

## 2018-10-05 PROCEDURE — 80048 BASIC METABOLIC PNL TOTAL CA: CPT

## 2018-10-05 PROCEDURE — G0378 HOSPITAL OBSERVATION PER HR: HCPCS

## 2018-10-05 PROCEDURE — 85027 COMPLETE CBC AUTOMATED: CPT

## 2018-10-05 PROCEDURE — 700102 HCHG RX REV CODE 250 W/ 637 OVERRIDE(OP): Performed by: UROLOGY

## 2018-10-05 RX ORDER — ONDANSETRON 4 MG/1
4 TABLET, FILM COATED ORAL EVERY 6 HOURS PRN
Qty: 10 TAB | Refills: 0 | Status: SHIPPED | OUTPATIENT
Start: 2018-10-05

## 2018-10-05 RX ADMIN — BACLOFEN 10 MG: 10 TABLET ORAL at 05:32

## 2018-10-05 RX ADMIN — GABAPENTIN 400 MG: 400 CAPSULE ORAL at 05:32

## 2018-10-05 RX ADMIN — LEVOTHYROXINE SODIUM 75 MCG: 50 TABLET ORAL at 05:32

## 2018-10-05 ASSESSMENT — PAIN SCALES - GENERAL
PAINLEVEL_OUTOF10: 0
PAINLEVEL_OUTOF10: 2

## 2018-10-05 NOTE — PROGRESS NOTES
"Patient discharged to home with friend \"Katelin\". All lines removed. Discharge instructions and prescription reviewed and understanding verbalized. Patient states they will fill prescriptions. Patient states they will return to emergency if discussed symptoms arise. Dressing supplies provided for previous nephrostomy tube site. Patient left via wheelchair and this RN escort. Medications from drawer removed and sent back to pharmacy or disposed of per protocol. Chart given to unit clerk.   "

## 2018-10-05 NOTE — PROGRESS NOTES
Bedside report received.  Assessment complete.  A&O x 4. Patient calls appropriately.  Patient up with one assist.   Patient has 2/10 pain. Denies pain medication.  Denies N&V. Tolerating diet.  L flank nephrostomy tube, draining bloody urine.  Patient denies SOB.  SCD's in place.  Review plan with of care with patient. Call light and personal belongings with in reach. Hourly rounding in place. All needs met at this time.

## 2018-10-05 NOTE — PROGRESS NOTES
"Urology Progress Note    Post op Day # 1. L PCNL    Overnight Events: None    S: No fevers, chills or vomiting.  Mild nausea. Passing flatus. Cunningham out and voiding.    O:   Blood pressure (!) 99/58, pulse 75, temperature 36.5 °C (97.7 °F), resp. rate 18, height 1.575 m (5' 2\"), weight 52.6 kg (115 lb 15.4 oz), SpO2 99 %, not currently breastfeeding.  Recent Labs      10/05/18   0509   SODIUM  134*   POTASSIUM  3.8   CHLORIDE  100   CO2  25   GLUCOSE  152*   BUN  19   CREATININE  1.22   CALCIUM  9.3     Recent Labs      10/05/18   0509   WBC  14.2*   RBC  3.92*   HEMOGLOBIN  11.8*   HEMATOCRIT  35.0*   MCV  89.3   MCH  30.1   MCHC  33.7   RDW  47.3   PLATELETCT  154*   MPV  11.8         Intake/Output Summary (Last 24 hours) at 10/05/18 1142  Last data filed at 10/05/18 0800   Gross per 24 hour   Intake             1100 ml   Output             1495 ml   Net             -395 ml       Exam:  Abdomen soft, benign.  Incisions clean, dry, intact.   Urine: clear bloody      A/P:  There are no active hospital problems to display for this patient.      Left nephrostomy tube removed and dressing applied  DC home today  F/U Dr Stewart in one week for stent removal  "

## 2018-10-05 NOTE — CARE PLAN
Problem: Communication  Goal: The ability to communicate needs accurately and effectively will improve  Outcome: PROGRESSING AS EXPECTED  Discussed POC with pt, all questions asked and answered at this time    Problem: Safety  Goal: Will remain free from falls  Outcome: PROGRESSING AS EXPECTED  Discussed not getting OOB without staff present.  Pt states and demonstrates understanding.  Call light within reach.    Problem: Fluid Volume:  Goal: Will maintain balanced intake and output  Outcome: PROGRESSING AS EXPECTED  Encouraged pt to increase fluid intake, menjivar catheter and stent in place at this time.

## 2018-10-05 NOTE — CARE PLAN
Problem: Safety  Goal: Will remain free from injury  Outcome: PROGRESSING AS EXPECTED  Patient free from accidental injury at this time. Safety precautions in place. Hourly rounding in place.      Problem: Pain Management  Goal: Pain level will decrease to patient's comfort goal  Outcome: PROGRESSING AS EXPECTED  Patient experiencing pain relief with MAR medication. Patient encouraged to call if pain worsens. Hourly rounding in place.

## 2018-10-05 NOTE — DISCHARGE INSTRUCTIONS
Discharge Instructions    Discharged to home by car with friend. Discharged via wheelchair, hospital escort: Yes.  Special equipment needed: Not Applicable    Be sure to schedule a follow-up appointment with your primary care doctor or any specialists as instructed.     Discharge Plan:   Diet Plan: Discussed  Activity Level: Discussed  Confirmed Follow up Appointment: Patient to Call and Schedule Appointment  Confirmed Symptoms Management: Discussed  Medication Reconciliation Updated: Yes  Influenza Vaccine Indication: Patient Refuses    I understand that a diet low in cholesterol, fat, and sodium is recommended for good health. Unless I have been given specific instructions below for another diet, I accept this instruction as my diet prescription.   Other diet: Regular    Special Instructions: None    · Is patient discharged on Warfarin / Coumadin?   No     Dressing Change  A dressing is a material placed over wounds. It keeps the wound clean, dry, and protected from further injury.   BEFORE YOU BEGIN  · Get your supplies together. Things you may need include:  ¨ Salt solution (saline).  ¨ Flexible gauze bandage.  ¨ Medicated cream.  ¨ Tape.  ¨ Gloves.  ¨ Belly (abdominal) pads.  ¨ Gauze squares.  ¨ Plastic bags.  · Take pain medicine 30 minutes before the bandage change if you need it.  · Take a shower before you do the first bandage change of the day. Put plastic wrap or a bag over the dressing.  REMOVING YOUR OLD BANDAGE  · Wash your hands with soap and water. Dry your hands with a clean towel.  · Put on your gloves.  · Remove any tape.  · Remove the old bandage as told. If it sticks, put a small amount of warm water on it to loosen the bandage.  · Remove any gauze or packing tape in your wound.  · Take off your gloves.  · Put the gloves, tape, gauze, or any packing tape in a plastic bag.  CHANGING YOUR BANDAGE  · Open the supplies.  · Take the cap off the salt solution.  · Open the gauze. Leave the gauze on the  inside of the package.  · Put on your gloves.  · Clean your wound as told by your doctor.  · Keep your wound dry if your doctor told you to do so.  · Your doctor may tell you to do one or more of the following:  ¨  the gauze. Pour the salt solution over the gauze. Squeeze out the extra salt solution.  ¨ Put medicated cream or other medicine on your wound.  ¨ Put solution soaked gauze only in your wound, not on the skin around it.  ¨ Pack your wound loosely.  ¨ Put dry gauze on your wound.  ¨ Put belly pads over the dry gauze if your bandages soak through.  · Tape the bandage in place so it will not fall off. Do not wrap the tape all the way around your arm or leg.  · Wrap the bandage with the flexible gauze bandage as told by your doctor.  · Take off your gloves. Put them in the plastic bag with the old bandage. Tie the bag shut and throw it away.  · Keep the bandage clean and dry.  · Wash your hands.  GET HELP RIGHT AWAY IF:   · Your skin around the wound looks red.  · Your wound feels more tender or sore.  · You see yellowish-white fluid (pus) in the wound.  · Your wound smells bad.  · You have a fever.  · Your skin around the wound has a red rash that itches and burns.  · You see black or yellow skin in your wound that was not there before.  · You feel sick to your stomach (nauseous), throw up (vomit), and feel very tired.     This information is not intended to replace advice given to you by your health care provider. Make sure you discuss any questions you have with your health care provider.     Document Released: 03/16/2010 Document Revised: 01/08/2016 Document Reviewed: 10/28/2012  [employer] Interactive Patient Education ©2016 [employer]      Depression / Suicide Risk    As you are discharged from this [facility] the [facility] facility, it is important to learn how to keep safe from harming yourself.    Recognize the warning signs:  · Abrupt changes in personality, positive or negative- including increase in  energy   · Giving away possessions  · Change in eating patterns- significant weight changes-  positive or negative  · Change in sleeping patterns- unable to sleep or sleeping all the time   · Unwillingness or inability to communicate  · Depression  · Unusual sadness, discouragement and loneliness  · Talk of wanting to die  · Neglect of personal appearance   · Rebelliousness- reckless behavior  · Withdrawal from people/activities they love  · Confusion- inability to concentrate     If you or a loved one observes any of these behaviors or has concerns about self-harm, here's what you can do:  · Talk about it- your feelings and reasons for harming yourself  · Remove any means that you might use to hurt yourself (examples: pills, rope, extension cords, firearm)  · Get professional help from the community (Mental Health, Substance Abuse, psychological counseling)  · Do not be alone:Call your Safe Contact- someone whom you trust who will be there for you.  · Call your local CRISIS HOTLINE 798-4621 or 040-139-3571  · Call your local Children's Mobile Crisis Response Team [place] (514) 811-0614 or www.TuneUp  · Call the toll free National Suicide Prevention Hotlines   · National Suicide Prevention Lifeline 856-511-LUTN (0332)  · National Hope Line Network 800-SUICIDE (492-8162)

## 2018-10-05 NOTE — DIETARY
"Nutrition services: Day 0 of admit.  Shanelle Unger is a 73 y.o. female with admitting DX of calculus of kidney, uric acid nephrolithiasis   Pt noted with nutrition admit screen trigger: unplanned wt loss PTA     Assessment:  Height: 157.5 cm (5' 2\")  Weight: 52.6 kg (115 lb 15.4 oz) via stand up scale on 10/4  Body mass index is 21.21 kg/m². - WNL   Diet/Intake: Regular diet - no meals recorded at this time     Evaluation:   1. Pt is POD#1 s/p left percutaneous nephrolithotomy for left renal stone   2. Per chart review, pt noted with recent hospitalizing 8/28-9/1 for left flank and abd pain. During pt's recent admission, she is noted with a wt of 54.4 kg (120#) - with current wt, pt is noted with a wt loss of 3% x 1 month which is not significant   3. Per chart review, no mention of poor po intake PTA   4. Serum Glucose 152, Na 134, K/Phos/Mg WNL   5. Meds reviewed   6. Last BM PTA  7. Noted with surgical incision to back, no skin breakdown noted at this time   8. Pt does not appear to be at high nutritional risk at this time     Recommendations/Plan:  1. Encourage PO Intake   2. Document all PO intake as % taken in ADL's to provide interdisciplinary communication across all shifts.   3. Monitor weight.  4. Nutrition rep will continue to see patient for ongoing meal and snack preferences.     RD will con't to monitor per dept policy, please consult nutrition services as warranted             "

## 2018-10-08 LAB
APPEARANCE STONE: NORMAL
COMPN STONE: NORMAL
NUMBER STONE: NORMAL
SIZE STONE: NORMAL MM
SPECIMEN WT: 627 MG

## 2021-07-19 ENCOUNTER — HOSPITAL ENCOUNTER (OUTPATIENT)
Facility: MEDICAL CENTER | Age: 76
End: 2021-07-19
Attending: PHYSICIAN ASSISTANT
Payer: MEDICARE

## 2021-07-19 PROCEDURE — 87186 SC STD MICRODIL/AGAR DIL: CPT

## 2021-07-19 PROCEDURE — 87086 URINE CULTURE/COLONY COUNT: CPT

## 2021-07-19 PROCEDURE — 87077 CULTURE AEROBIC IDENTIFY: CPT

## 2021-07-23 LAB
BACTERIA UR CULT: ABNORMAL
BACTERIA UR CULT: ABNORMAL
SIGNIFICANT IND 70042: ABNORMAL
SITE SITE: ABNORMAL
SOURCE SOURCE: ABNORMAL

## 2023-05-18 ENCOUNTER — APPOINTMENT (RX ONLY)
Dept: URBAN - METROPOLITAN AREA CLINIC 4 | Facility: CLINIC | Age: 78
Setting detail: DERMATOLOGY
End: 2023-05-18

## 2023-05-18 DIAGNOSIS — L98.8 OTHER SPECIFIED DISORDERS OF THE SKIN AND SUBCUTANEOUS TISSUE: ICD-10-CM

## 2023-05-18 DIAGNOSIS — D18.0 HEMANGIOMA: ICD-10-CM

## 2023-05-18 DIAGNOSIS — L82.0 INFLAMED SEBORRHEIC KERATOSIS: ICD-10-CM

## 2023-05-18 DIAGNOSIS — Z71.89 OTHER SPECIFIED COUNSELING: ICD-10-CM

## 2023-05-18 DIAGNOSIS — D22 MELANOCYTIC NEVI: ICD-10-CM

## 2023-05-18 DIAGNOSIS — L82.1 OTHER SEBORRHEIC KERATOSIS: ICD-10-CM

## 2023-05-18 DIAGNOSIS — L81.4 OTHER MELANIN HYPERPIGMENTATION: ICD-10-CM

## 2023-05-18 PROBLEM — D22.5 MELANOCYTIC NEVI OF TRUNK: Status: ACTIVE | Noted: 2023-05-18

## 2023-05-18 PROBLEM — D18.01 HEMANGIOMA OF SKIN AND SUBCUTANEOUS TISSUE: Status: ACTIVE | Noted: 2023-05-18

## 2023-05-18 PROBLEM — D22.39 MELANOCYTIC NEVI OF OTHER PARTS OF FACE: Status: ACTIVE | Noted: 2023-05-18

## 2023-05-18 PROCEDURE — 17110 DESTRUCTION B9 LES UP TO 14: CPT

## 2023-05-18 PROCEDURE — 99203 OFFICE O/P NEW LOW 30 MIN: CPT | Mod: 25

## 2023-05-18 PROCEDURE — 99406 BEHAV CHNG SMOKING 3-10 MIN: CPT

## 2023-05-18 PROCEDURE — ? SMOKING CESSATION COUNSELING

## 2023-05-18 PROCEDURE — ? LIQUID NITROGEN

## 2023-05-18 PROCEDURE — ? COUNSELING

## 2023-05-18 ASSESSMENT — LOCATION ZONE DERM
LOCATION ZONE: LIP
LOCATION ZONE: FACE
LOCATION ZONE: ARM
LOCATION ZONE: HAND
LOCATION ZONE: TRUNK

## 2023-05-18 ASSESSMENT — LOCATION SIMPLE DESCRIPTION DERM
LOCATION SIMPLE: UPPER BACK
LOCATION SIMPLE: LEFT CHEEK
LOCATION SIMPLE: LEFT UPPER BACK
LOCATION SIMPLE: RIGHT SHOULDER
LOCATION SIMPLE: CHEST
LOCATION SIMPLE: LEFT HAND
LOCATION SIMPLE: LEFT SUPERIOR LIP
LOCATION SIMPLE: LEFT INFERIOR LIP

## 2023-05-18 ASSESSMENT — LOCATION DETAILED DESCRIPTION DERM
LOCATION DETAILED: LEFT MEDIAL SUPERIOR CHEST
LOCATION DETAILED: LEFT SUPERIOR VERMILION LIP
LOCATION DETAILED: INFERIOR THORACIC SPINE
LOCATION DETAILED: LEFT RADIAL DORSAL HAND
LOCATION DETAILED: LEFT SUPERIOR MEDIAL UPPER BACK
LOCATION DETAILED: LEFT INFERIOR CENTRAL MALAR CHEEK
LOCATION DETAILED: RIGHT MEDIAL INFERIOR CHEST
LOCATION DETAILED: RIGHT POSTERIOR SHOULDER
LOCATION DETAILED: LEFT INFERIOR VERMILION LIP

## 2023-05-18 NOTE — PROCEDURE: COUNSELING
Detail Level: Zone
Detail Level: Generalized
Detail Level: Detailed
Sunscreen Recommendation Label Override: Broad Spectrum Sunscreen minimum 30+

## 2023-05-18 NOTE — HPI: SKIN LESIONS
Is This A New Presentation, Or A Follow-Up?: Skin Lesions
How Severe Is Your Skin Lesion?: moderate
Have Your Skin Lesions Been Treated?: not been treated
Additional History: This is the patients first time to a dermatology office.

## 2023-05-18 NOTE — PROCEDURE: LIQUID NITROGEN
Render Post-Care Instructions In Note?: yes
Number Of Freeze-Thaw Cycles: 3 freeze-thaw cycles
Spray Paint Technique: No
Medical Necessity Clause: This procedure was medically necessary because the lesions that were treated were:
Medical Necessity Information: It is in your best interest to select a reason for this procedure from the list below. All of these items fulfill various CMS LCD requirements except the new and changing color options.
Duration Of Freeze Thaw-Cycle (Seconds): 5
Detail Level: Detailed
Post-Care Instructions: I reviewed with the patient in detail post-care instructions. Patient is to wear sunprotection, and avoid picking at any of the treated lesions. Pt may apply Vaseline to crusted or scabbing areas.
Spray Paint Text: The liquid nitrogen was applied to the skin utilizing a spray paint frosting technique.
Consent: The patient's consent was obtained including but not limited to risks of crusting, scabbing, blistering, scarring, darker or lighter pigmentary change, recurrence, incomplete removal and infection.
Application Tool (Optional): Cry-AC

## 2023-05-18 NOTE — PROCEDURE: SMOKING CESSATION COUNSELING
Time Spent Counseling (Min): 3
Detail Level: Detailed
Counseling Text: Counseled the patient to stop smoking for health reasons

## 2023-05-18 NOTE — PROCEDURE: MIPS QUALITY
Quality 226: Preventive Care And Screening: Tobacco Use: Screening And Cessation Intervention: Patient screened for tobacco use, is a smoker AND received Cessation Counseling within measurement period or in the six months prior to the measurement period
Quality 130: Documentation Of Current Medications In The Medical Record: Current Medications Documented
Detail Level: Simple

## 2025-03-06 NOTE — PROGRESS NOTES
Pt is A&O 4  Pain denies  Some nausea/vomiting at 0230, pt thinks it was d/t drinking a lot of fluid quickly.  Lost IV access at this time attempted 3 sticks with no successful IV start, pt did not want any further attempts for IV antinausea medication.  No other IV medications scheduled, no other PRN IV medications needed at this time.  Pt agreed to decrease the size and time interval in between sips.  No other c/o nausea, no vomiting at this time.  Tolerating Diet regular  Original surgical dressing in place to L flank, scant old drainage visible.  Stent in place to L flank, small to moderate serosanguinous drainage  + Urine Void to menjivar, pink urine  + Flatus  - BM Void  Up standby with FWW   Reviewed plan of care with patient, bed in lowest position and locked, pt resting comfortably now, call light within reach, all needs met at this time   ,

## 2025-05-27 ENCOUNTER — HOSPITAL ENCOUNTER (OUTPATIENT)
Facility: MEDICAL CENTER | Age: 80
End: 2025-05-27
Attending: PHYSICIAN ASSISTANT
Payer: MEDICARE

## 2025-05-27 PROCEDURE — 87186 SC STD MICRODIL/AGAR DIL: CPT

## 2025-05-27 PROCEDURE — 87086 URINE CULTURE/COLONY COUNT: CPT

## 2025-05-27 PROCEDURE — 87077 CULTURE AEROBIC IDENTIFY: CPT

## 2025-05-30 LAB
BACTERIA UR CULT: ABNORMAL
BACTERIA UR CULT: ABNORMAL
SIGNIFICANT IND 70042: ABNORMAL
SITE SITE: ABNORMAL
SOURCE SOURCE: ABNORMAL

## (undated) DEVICE — ARMREST CRADLE FOAM - (2PR/PK 12PR/CA)

## (undated) DEVICE — CATHETER URETHRAL FOLEY SILICONE OD16 FR 10 ML (10EA/CA)

## (undated) DEVICE — GUIDEWIRE AMPLATZ STIFF .035 145CM 7 STRAIGHT (5/BX)

## (undated) DEVICE — WATER IRRIGATION STERILE 1000ML (12EA/CA)

## (undated) DEVICE — TUBING CLEARLINK DUO-VENT - C-FLO (48EA/CA)

## (undated) DEVICE — DRAPE C-ARM LARGE 41IN X 74 IN - (10/BX 2BX/CA)

## (undated) DEVICE — SPONGE DRAIN 4 X 4IN 6-PLY - (2/PK25PK/BX12BX/CS)

## (undated) DEVICE — SUCTION INSTRUMENT YANKAUER BULBOUS TIP W/O VENT (50EA/CA)

## (undated) DEVICE — LACTATED RINGERS INJ 1000 ML - (14EA/CA 60CA/PF)

## (undated) DEVICE — BAG DRAINAGE URINARY CLOSED 2000ML (20EA/CA)

## (undated) DEVICE — NEPTUNE 4 PORT MANIFOLD - (20/PK)

## (undated) DEVICE — SUTURE 0 SILK CT-1 (36PK/BX)

## (undated) DEVICE — GOWN WARMING STANDARD FLEX - (30/CA)

## (undated) DEVICE — CATH, COUNCIL TIP 20 FR

## (undated) DEVICE — MEDICINE CUP STERILE 2 OZ - (100/CA)

## (undated) DEVICE — SPECIMEN PLASTIC CONVERTOR - (10/CA)

## (undated) DEVICE — SET LEADWIRE 5 LEAD BEDSIDE DISPOSABLE ECG (1SET OF 5/EA)

## (undated) DEVICE — GLOVE BIOGEL SZ 7.5 SURGICAL PF LTX - (50PR/BX 4BX/CA)

## (undated) DEVICE — SET IRRIGATION CYSTOSCOPY Y-TYPE L81 IN (20EA/CA)

## (undated) DEVICE — SUTURE GENERAL

## (undated) DEVICE — CATHETER BALLOON NEPHROMAX

## (undated) DEVICE — DRAPE LARGE 3 QUARTER - (20/CA)

## (undated) DEVICE — DRAPE T CRANIOTOMY W/POUCH - (9/CA)

## (undated) DEVICE — TUBE E-T HI-LO CUFF 7.0MM (10EA/PK)

## (undated) DEVICE — CANISTER SUCTION 3000ML MECHANICAL FILTER AUTO SHUTOFF MEDI-VAC NONSTERILE LF DISP  (40EA/CA)

## (undated) DEVICE — MASK ANESTHESIA ADULT  - (100/CA)

## (undated) DEVICE — WIRE GUIDE SENSOR DUAL FLEX - 5/BX

## (undated) DEVICE — PACK MINOR BASIN - (2EA/CA)

## (undated) DEVICE — KIT ANESTHESIA W/CIRCUIT & 3/LT BAG W/FILTER (20EA/CA)

## (undated) DEVICE — SODIUM CHL. IRRIGATION 0.9% 3000ML (4EA/CA 65CA/PF)

## (undated) DEVICE — CONTAINER SPECIMEN BAG OR - STERILE 4 OZ W/LID (100EA/CA)

## (undated) DEVICE — PROBE PNEUMATIC DISP 1MMX497MM

## (undated) DEVICE — SLEEVE, VASO, THIGH, MED

## (undated) DEVICE — DRESSING ABDOMINAL PAD STERILE 8 X 10" (360EA/CA)"

## (undated) DEVICE — SYRINGE 10 ML CONTROL LL (25EA/BX 4BX/CA)

## (undated) DEVICE — TUBE CONNECT SUCTION CLEAR 120 X 1/4" (50EA/CA)"

## (undated) DEVICE — PROBE ULTRASOUND DISP 3.8X330

## (undated) DEVICE — SET EXTENSION WITH 2 PORTS (48EA/CA) ***PART #2C8610 IS A SUBSTITUTE*****

## (undated) DEVICE — SODIUM CHL IRRIGATION 0.9% 1000ML (12EA/CA)

## (undated) DEVICE — ELECTRODE 850 FOAM ADHESIVE - HYDROGEL RADIOTRNSPRNT (50/PK)

## (undated) DEVICE — WIRE GUIDE .035X145 - (5/BX)

## (undated) DEVICE — GLOVE BIOGEL SZ 7 SURGICAL PF LTX - (50PR/BX 4BX/CA)

## (undated) DEVICE — KIT ROOM DECONTAMINATION

## (undated) DEVICE — SPONGE GAUZESTER 4 X 4 4PLY - (128PK/CA)

## (undated) DEVICE — BLADE SURGICAL #11 - (50/BX)

## (undated) DEVICE — SENSOR SPO2 NEO LNCS ADHESIVE (20/BX) SEE USER NOTES